# Patient Record
Sex: MALE | Race: WHITE | NOT HISPANIC OR LATINO | Employment: FULL TIME | URBAN - NONMETROPOLITAN AREA
[De-identification: names, ages, dates, MRNs, and addresses within clinical notes are randomized per-mention and may not be internally consistent; named-entity substitution may affect disease eponyms.]

---

## 2019-09-24 ENCOUNTER — OFFICE VISIT (OUTPATIENT)
Dept: INTERNAL MEDICINE | Facility: CLINIC | Age: 29
End: 2019-09-24

## 2019-09-24 VITALS
SYSTOLIC BLOOD PRESSURE: 145 MMHG | OXYGEN SATURATION: 100 % | HEART RATE: 84 BPM | BODY MASS INDEX: 26.82 KG/M2 | DIASTOLIC BLOOD PRESSURE: 94 MMHG | HEIGHT: 72 IN | TEMPERATURE: 97.9 F | WEIGHT: 198 LBS

## 2019-09-24 DIAGNOSIS — Z13.0 SCREENING FOR DISORDER OF BLOOD AND BLOOD-FORMING ORGANS: ICD-10-CM

## 2019-09-24 DIAGNOSIS — R22.32 MASS OF LEFT FOREARM: Primary | ICD-10-CM

## 2019-09-24 DIAGNOSIS — Z13.29 SCREENING FOR ENDOCRINE, METABOLIC AND IMMUNITY DISORDER: ICD-10-CM

## 2019-09-24 DIAGNOSIS — Z13.228 SCREENING FOR ENDOCRINE, METABOLIC AND IMMUNITY DISORDER: ICD-10-CM

## 2019-09-24 DIAGNOSIS — Z13.0 SCREENING FOR ENDOCRINE, METABOLIC AND IMMUNITY DISORDER: ICD-10-CM

## 2019-09-24 PROCEDURE — 99202 OFFICE O/P NEW SF 15 MIN: CPT | Performed by: NURSE PRACTITIONER

## 2019-09-24 NOTE — PROGRESS NOTES
Date: 2019    Name: Femi Galvez  : 1990    Chief Complaint:   Chief Complaint   Patient presents with   • Establish Care       HPI: Mr. Femi Galvez is a 29 y.o. male presents to establish care.  He has a knot on his inner left forearm, near elbow.  First noticed by his girlfriend almost 2 months ago.  He reports an injury, a strain or contusion, of left forearm about a year ago.  Told to stop lifting weights, used ice packs at home.  Lifts more carefully at work, now.  Reports pain of knot is 2/10 today.  2 days ago his entire lower left forearm was burning.  Used ice, felt like pain and burning was improved.  No tingling, numbness in left forearm or hand. Has noticed occasional decrease in  strength in left hand.      History:    Past Medical History:   Diagnosis Date   • Asthma    • Bipolar 1 disorder (CMS/[facility])    • Depression    • Heart disease    • Heart murmur        History reviewed. No pertinent surgical history.    Family History   Problem Relation Age of Onset   • Hypertension Mother    • Arthritis Father    • Hypertension Father    • Hyperlipidemia Father    • Hypertension Maternal Aunt    • Hypertension Maternal Uncle    • Cancer Maternal Uncle    • Hypertension Paternal Aunt    • Diabetes Paternal Uncle    • Hypertension Paternal Uncle    • Hypertension Maternal Grandmother    • Hypertension Maternal Grandfather    • Arthritis Paternal Grandmother    • Hypertension Paternal Grandmother    • Arthritis Paternal Grandfather    • Hypertension Paternal Grandfather        Social History     Socioeconomic History   • Marital status: Single     Spouse name: Not on file   • Number of children: Not on file   • Years of education: Not on file   • Highest education level: Not on file   Tobacco Use   • Smoking status: Current Some Day Smoker     Types: Electronic Cigarette   • Smokeless tobacco: Never Used   Substance and Sexual Activity   • Alcohol use: Yes     Comment: social   • Drug use:  "No       No Known Allergies    No current outpatient medications on file.    ROS:  Review of Systems   Constitutional: Negative for fatigue.   Respiratory: Negative for cough, shortness of breath and wheezing.    Cardiovascular: Negative for chest pain, palpitations and leg swelling.   Gastrointestinal: Negative for constipation, diarrhea and nausea.   Neurological: Negative for dizziness and headache.       VS:  Vitals:    09/24/19 1630   BP: 145/94   Pulse: 84   Temp: 97.9 °F (36.6 °C)   TempSrc: Temporal   SpO2: 100%   Weight: 89.8 kg (198 lb)   Height: 182.9 cm (72\")       PE:  Physical Exam   Constitutional: He is oriented to person, place, and time. He appears well-developed and well-nourished. No distress.   HENT:   Mouth/Throat: Oropharynx is clear and moist.   Eyes: Conjunctivae are normal.   Cardiovascular: Normal rate, regular rhythm and intact distal pulses.   Murmur heard.  Pulmonary/Chest: Effort normal and breath sounds normal.   Musculoskeletal:    equal bilaterally   Neurological: He is alert and oriented to person, place, and time.   Skin: Skin is warm. Capillary refill takes less than 2 seconds.   1mm soft, smooth. mobile nodule of inner left forearm near elbow; does not transilluminate, tender to palpation, no erythema       Assessment/Plan:  Fmei was seen today for establish care.    Diagnoses and all orders for this visit:    Mass of left forearm  -     US soft tissue  - Advised this may be an enlarged lymph node or lipoma; requests to have further testing to determine what it is  - Continue to use ice, as needed for pain, if effective  Screening for disorder of blood and blood-forming organs  -     CBC (No Diff)  Screening for endocrine, metabolic and immunity disorder  -     Comprehensive Metabolic Panel    Return if symptoms worsen or fail to improve.                  "

## 2019-10-01 ENCOUNTER — HOSPITAL ENCOUNTER (OUTPATIENT)
Dept: ULTRASOUND IMAGING | Facility: HOSPITAL | Age: 29
Discharge: HOME | End: 2019-10-01
Admitting: NURSE PRACTITIONER

## 2019-10-01 PROCEDURE — 76882 US LMTD JT/FCL EVL NVASC XTR: CPT

## 2019-10-21 ENCOUNTER — OFFICE VISIT (OUTPATIENT)
Dept: INTERNAL MEDICINE | Facility: CLINIC | Age: 29
End: 2019-10-21

## 2019-10-21 VITALS
BODY MASS INDEX: 26.95 KG/M2 | OXYGEN SATURATION: 100 % | TEMPERATURE: 98.1 F | HEIGHT: 72 IN | DIASTOLIC BLOOD PRESSURE: 81 MMHG | SYSTOLIC BLOOD PRESSURE: 148 MMHG | WEIGHT: 199 LBS | HEART RATE: 79 BPM

## 2019-10-21 DIAGNOSIS — D17.22 LIPOMA OF LEFT UPPER EXTREMITY: Primary | ICD-10-CM

## 2019-10-21 DIAGNOSIS — F31.9 BIPOLAR AFFECTIVE DISORDER, REMISSION STATUS UNSPECIFIED (HCC): ICD-10-CM

## 2019-10-21 DIAGNOSIS — Z23 NEED FOR HEPATITIS A VACCINATION: ICD-10-CM

## 2019-10-21 DIAGNOSIS — H93.239 PAINFUL SENSITIVITY TO SOUND: ICD-10-CM

## 2019-10-21 PROCEDURE — 99214 OFFICE O/P EST MOD 30 MIN: CPT | Performed by: NURSE PRACTITIONER

## 2019-10-21 PROCEDURE — 90632 HEPA VACCINE ADULT IM: CPT | Performed by: NURSE PRACTITIONER

## 2019-10-21 PROCEDURE — 90471 IMMUNIZATION ADMIN: CPT | Performed by: NURSE PRACTITIONER

## 2019-10-21 NOTE — PROGRESS NOTES
"Date: 10/21/2019    Name: Femi Galvez  : 1990    Chief Complaint:   Chief Complaint   Patient presents with   • Follow-up       HPI:  Femi Galvez is a 29 y.o. male presents for follow up of lipoma of left arm.  He reports it has not grown, does not hurt.  He would like to have it removed, as he plans to start lifting weights soon and feels it would aggravate lipoma, cause it to become problematic.    Femi reports he has a history of bipolar disorder and feels he may have Asperger's syndrome.  He feels he does not need medication or counseling for bipolar disorder at this time.  He does report an increase in depressive symptoms, \"occasionally\".  He reports a history of auditory hallucinations, not experiencing those at this time.  Currently denies SI, HI, change in appetite, difficulty sleeping or sleeping too much, racing thoughts excessive worry, impulsive behavior.  He believes he may have Asperger's syndrome based on feeling that he does not know how to connect with other people, cannot read nonverbal cues well.  His current significant other has a child with autism, and he recognizes behaviors that he has noticed in himself.  He is unable to relate to specific behaviors at this time, other than irritability and pain caused by certain sounds, patterns of sounds.    History:  The following portions of the patient's history were reviewed and updated as appropriate: allergies, current medications, past medical history, family history, surgical history, social history and problem list.      ROS:  Review of Systems   Constitutional: Negative for fatigue.   Respiratory: Negative for cough, shortness of breath and wheezing.    Cardiovascular: Negative for chest pain, palpitations and leg swelling.   Neurological: Negative for dizziness, headache and memory problem.   Psychiatric/Behavioral: Negative for agitation, decreased concentration, dysphoric mood and negative for hyperactivity. " "      VS:  Vitals:    10/21/19 1648   BP: 148/81   Pulse: 79   Temp: 98.1 °F (36.7 °C)   TempSrc: Temporal   SpO2: 100%   Weight: 90.3 kg (199 lb)   Height: 182.9 cm (72\")       PE:  Physical Exam   Constitutional: He is oriented to person, place, and time. He appears well-developed and well-nourished. No distress.   HENT:   Head: Normocephalic.   Mouth/Throat: Oropharynx is clear and moist.   Eyes: Conjunctivae and EOM are normal. Pupils are equal, round, and reactive to light.   Cardiovascular: Normal rate, regular rhythm, normal heart sounds and intact distal pulses.   Pulmonary/Chest: Effort normal and breath sounds normal.   Neurological: He is alert and oriented to person, place, and time.   Skin: Capillary refill takes less than 2 seconds.   Psychiatric: He has a normal mood and affect. His speech is normal and behavior is normal. Thought content normal. Cognition and memory are normal.     Assessment/Plan:  Femi was seen today for follow-up.    Diagnoses and all orders for this visit:    Lipoma of left upper extremity  -     Ambulatory Referral to General Surgery    Need for hepatitis A vaccination  -     Hepatitis A Vaccine Adult (HAVRIX) - Today  -     Hepatitis A Vaccine Adult  (HAVRIX) - Dose 2; Future    Bipolar affective disorder, remission status unspecified (CMS/HCC)  -     Ambulatory Referral to Psychiatry      Return if symptoms worsen or fail to improve.        "

## 2019-10-23 ENCOUNTER — TELEPHONE (OUTPATIENT)
Dept: INTERNAL MEDICINE | Facility: CLINIC | Age: 29
End: 2019-10-23

## 2019-10-23 NOTE — TELEPHONE ENCOUNTER
----- Message from RADHA Wallace sent at 10/23/2019  8:11 AM EDT -----  Please call patient and advise him I have referred him to psychiatry for further evaluation of possible asbergers syndrome.

## 2020-02-11 ENCOUNTER — OFFICE VISIT (OUTPATIENT)
Dept: INTERNAL MEDICINE | Facility: CLINIC | Age: 30
End: 2020-02-11

## 2020-02-11 VITALS
BODY MASS INDEX: 27.77 KG/M2 | SYSTOLIC BLOOD PRESSURE: 160 MMHG | DIASTOLIC BLOOD PRESSURE: 102 MMHG | HEIGHT: 72 IN | TEMPERATURE: 98 F | HEART RATE: 91 BPM | OXYGEN SATURATION: 100 % | WEIGHT: 205 LBS

## 2020-02-11 DIAGNOSIS — J06.9 UPPER RESPIRATORY TRACT INFECTION, UNSPECIFIED TYPE: Primary | ICD-10-CM

## 2020-02-11 DIAGNOSIS — L04.9 LYMPHADENITIS, ACUTE: ICD-10-CM

## 2020-02-11 PROCEDURE — 99214 OFFICE O/P EST MOD 30 MIN: CPT | Performed by: INTERNAL MEDICINE

## 2020-02-11 RX ORDER — AZITHROMYCIN 250 MG/1
TABLET, FILM COATED ORAL
Qty: 6 TABLET | Refills: 0 | Status: SHIPPED | OUTPATIENT
Start: 2020-02-11 | End: 2020-02-14

## 2020-02-11 NOTE — PATIENT INSTRUCTIONS
Infectious Mononucleosis  Infectious mononucleosis is a viral infection. It is often referred to as “mono.” It causes symptoms that affect various areas of the body, including the throat, upper air passages, and lymph glands. The liver or spleen may also be affected.  The virus spreads from person to person (is contagious) through close contact. The illness is usually not serious, and it typically goes away in 2-4 weeks without treatment. In rare cases, symptoms can be more severe and last longer, sometimes up to several months.  What are the causes?  This condition is commonly caused by the Lilly-Barr virus. This virus spreads through:  · Contact with an infected person’s saliva or other bodily fluids, often through:  ? Kissing.  ? Sexual contact.  ? Coughing.  ? Sneezing.  · Sharing utensils or drinking glasses that were recently used by an infected person.  · Blood transfusions.  · Organ transplantation.  What increases the risk?  You are more likely to develop this condition if:  · You are 15-24 years old.  What are the signs or symptoms?  Symptoms of this condition usually appear 4-6 weeks after infection. Symptoms may develop slowly and occur at different times. Common symptoms include:  · Sore throat.  · Headache.  · Extreme fatigue.  · Muscle aches.  · Swollen glands.  · Fever.  · Poor appetite.  · Rash.  Other symptoms include:  · Enlarged liver or spleen.  · Nausea.  · Abdominal pain.  How is this diagnosed?  This condition may be diagnosed based on:  · Your medical history.  · Your symptoms.  · A physical exam.  · Blood tests to confirm the diagnosis.  How is this treated?  There is no cure for this condition. Infectious mononucleosis usually goes away on its own with time. Treatment can help relieve symptoms and may include:  · Taking medicines to relieve pain and fever.  · Drinking plenty of fluids.  · Getting a lot of rest.  · Medicine (corticosteroids)to reduce swelling. This may be used if swelling  in the throat causes breathing or swallowing problems.  In some severe cases, treatment has to be given in a hospital.  Follow these instructions at home:  Medicines  · Take over-the-counter and prescription medicines only as told by your health care provider.  · Do not take ampicillin or amoxicillin. This may cause a rash.  · If you are under 18, do not take aspirin because of the association with Reye syndrome.  Activity  · Rest as needed.  · Do not participate in any of the following activities until your health care provider approves:  ? Contact sports. You may need to wait at least a month before participating in sports.  ? Exercise that requires a lot of energy.  ? Heavy lifting.  · Gradually resume your normal activities after your fever is gone, or when your health care provider tells you that you can. Be sure to rest when you get tired.  General instructions    · Avoid kissing or sharing utensils or drinking glasses until your health care provider tells you that you are no longer contagious.  · Drink enough fluid to keep your urine clear or pale yellow.  · Do not drink alcohol.  · If you have a sore throat:  ? Gargle with a salt-water mixture 3-4 times a day or as needed. To make a salt-water mixture, completely dissolve ½-1 tsp of salt in 1 cup of warm water.  ? Eat soft foods. Cold foods such as ice cream or frozen ice pops can soothe a sore throat.  ? Try sucking on hard candy.  · Wash your hands often with soap and water to avoid spreading the infection. If soap and water are not available, use hand .  How is this prevented?    · Avoid contact with people who are infected with mononucleosis. An infected person may not always appear ill, but he or she can still spread the virus.  · Avoid sharing utensils, drinking glasses, or toothbrushes.  · Wash your hands frequently with soap and water. If soap and water are not available, use hand .  · Use the inside of your elbow to cover your  "mouth when coughing or sneezing.  Contact a health care provider if:  · Your fever is not gone after 10 days.  · You have swollen lymph nodes that are not back to normal after 4 weeks.  · Your activity level is not back to normal after 2 months.  · Your skin or the white parts of your eyes turn yellow (jaundice).  · You have constipation. This may mean that you have:  ? Fewer bowel movements in a week than normal.  ? Difficulty having a bowel movement.  ? Stools that are dry, hard, or larger than normal.  Get help right away if:  · You have severe pain in your abdomen or shoulder.  · You are drooling.  · You have trouble swallowing.  · You have trouble breathing.  · You develop a stiff neck.  · You develop a severe headache.  · You cannot stop vomiting.  · You have jerky movements that you cannot control (seizures).  · You are confused.  · You have trouble with balance.  · Your nose or gums begin to bleed.  · You have signs of dehydration. These may include:  ? Weakness.  ? Sunken eyes.  ? Pale skin.  ? Dry mouth.  ? Rapid breathing or pulse.  Summary  · Infectious mononucleosis, or \"mono,\" is an infection caused by the Lilly-Barr virus.  · The virus that causes this condition is spread through bodily fluids. The virus is most commonly spread by kissing or sharing drinks or utensils with an infected person.  · You are more likely to develop this infection if you are 15-24 years old.  · Symptoms of this condition can include sore throat, headache, fever, swollen glands, muscle aches, extreme fatigue, and swollen liver or spleen.  · There is no cure for this condition. The goal of treatment is to help relieve symptoms. Treatment may include drinking plenty of water, getting a lot of rest, and taking pain relievers.  This information is not intended to replace advice given to you by your health care provider. Make sure you discuss any questions you have with your health care provider.  Document Released: 12/15/2001 " Document Revised: 09/05/2017 Document Reviewed: 09/05/2017  [employer] Interactive Patient Education © 2019 [employer]

## 2020-02-13 ENCOUNTER — TELEPHONE (OUTPATIENT)
Dept: INTERNAL MEDICINE | Facility: CLINIC | Age: 30
End: 2020-02-13

## 2020-02-13 NOTE — TELEPHONE ENCOUNTER
Doctors note created and faxed to # provided. Spoke with patient to let him know that appointment was cancelled because he was given incorrect information for his original appointment (scheduled 02/13 but was told 02/11) and we worked him in on 02/11/20 therefore his other appointment was not needed. Patient voiced understanding.

## 2020-02-13 NOTE — TELEPHONE ENCOUNTER
Patient called requesting a doctors note for 02/12/20-02/13/20 for his job.     Patient would like this faxed to: [phone].    Patient call back: [phone]

## 2020-02-14 ENCOUNTER — OFFICE VISIT (OUTPATIENT)
Dept: INTERNAL MEDICINE | Facility: CLINIC | Age: 30
End: 2020-02-14

## 2020-02-14 VITALS
DIASTOLIC BLOOD PRESSURE: 98 MMHG | WEIGHT: 205.8 LBS | TEMPERATURE: 97.8 F | HEIGHT: 72 IN | BODY MASS INDEX: 27.87 KG/M2 | HEART RATE: 102 BPM | OXYGEN SATURATION: 98 % | SYSTOLIC BLOOD PRESSURE: 148 MMHG

## 2020-02-14 DIAGNOSIS — J06.9 ACUTE URI: Primary | ICD-10-CM

## 2020-02-14 DIAGNOSIS — R59.1 LYMPHADENOPATHY OF HEAD AND NECK: ICD-10-CM

## 2020-02-14 PROCEDURE — 99213 OFFICE O/P EST LOW 20 MIN: CPT | Performed by: FAMILY MEDICINE

## 2020-02-14 RX ORDER — BENZONATATE 200 MG/1
200 CAPSULE ORAL 3 TIMES DAILY PRN
Qty: 30 CAPSULE | Refills: 0 | OUTPATIENT
Start: 2020-02-14 | End: 2020-04-08

## 2020-02-14 RX ORDER — AMOXICILLIN AND CLAVULANATE POTASSIUM 875; 125 MG/1; MG/1
1 TABLET, FILM COATED ORAL EVERY 12 HOURS SCHEDULED
Qty: 20 TABLET | Refills: 0 | OUTPATIENT
Start: 2020-02-14 | End: 2020-04-08

## 2020-02-14 RX ORDER — AMOXICILLIN AND CLAVULANATE POTASSIUM 875; 125 MG/1; MG/1
1 TABLET, FILM COATED ORAL EVERY 12 HOURS SCHEDULED
Qty: 20 TABLET | Refills: 0 | Status: SHIPPED | OUTPATIENT
Start: 2020-02-14 | End: 2020-02-14 | Stop reason: SDUPTHER

## 2020-02-14 RX ORDER — METHYLPREDNISOLONE 4 MG/1
TABLET ORAL
Qty: 21 EACH | Refills: 0 | OUTPATIENT
Start: 2020-02-14 | End: 2020-04-08

## 2020-02-14 RX ORDER — METHYLPREDNISOLONE 4 MG/1
TABLET ORAL
Qty: 21 EACH | Refills: 0 | Status: SHIPPED | OUTPATIENT
Start: 2020-02-14 | End: 2020-02-14 | Stop reason: SDUPTHER

## 2020-02-14 RX ORDER — BENZONATATE 200 MG/1
200 CAPSULE ORAL 3 TIMES DAILY PRN
Qty: 30 CAPSULE | Refills: 0 | Status: SHIPPED | OUTPATIENT
Start: 2020-02-14 | End: 2020-02-14 | Stop reason: SDUPTHER

## 2020-02-14 NOTE — PROGRESS NOTES
Chief Complaint   Patient presents with   • Cough     progressively getting worse for last 5 months, lymph nodes under chin are swollen since last week, now painful with talking and swallowing       Subjective     History of Present Illness   Femi Galvez is a 29 y.o. male presenting for follow up with concerns about a large and tender lymph lymph node under the right mandible over the last week.  The lesion is becoming more tender and painful with talking and swallowing.  He denies any other fevers or chills.  Denies significant upper respiratory symptoms other than mild sore throat.  Patient shares that he has been having a cough which is persistent over the last 5 months.    The following portions of the patient's history were reviewed and updated as appropriate: allergies, current medications, past family history, past medical history, past social history, past surgical history and problem list.    Review of Systems   Constitutional: Negative for chills, fatigue and fever.   HENT: Positive for sore throat. Negative for congestion, ear pain, rhinorrhea and sinus pressure.    Eyes: Negative for visual disturbance.   Respiratory: Negative for cough, chest tightness, shortness of breath and wheezing.    Cardiovascular: Negative for chest pain, palpitations and leg swelling.   Gastrointestinal: Negative for abdominal pain, blood in stool, constipation, diarrhea, nausea and vomiting.   Endocrine: Negative for polydipsia and polyuria.   Genitourinary: Negative for dysuria and hematuria.   Musculoskeletal: Negative for arthralgias and back pain.   Skin: Negative for rash.   Neurological: Negative for dizziness, light-headedness, numbness and headaches.   Psychiatric/Behavioral: Negative for dysphoric mood and sleep disturbance. The patient is not nervous/anxious.        Allergies   Allergen Reactions   • Marijuana (Cannabis Sativa) Swelling       Past Medical History:   Diagnosis Date   • Asthma    • Bipolar 1  "disorder (CMS/[facility])    • Depression    • Heart disease    • Heart murmur        Social History     Socioeconomic History   • Marital status: Single     Spouse name: Not on file   • Number of children: Not on file   • Years of education: Not on file   • Highest education level: Not on file   Tobacco Use   • Smoking status: Current Some Day Smoker     Types: Electronic Cigarette   • Smokeless tobacco: Never Used   Substance and Sexual Activity   • Alcohol use: Yes     Comment: social   • Drug use: No        History reviewed. No pertinent surgical history.    Family History   Problem Relation Age of Onset   • Hypertension Mother    • Arthritis Father    • Hypertension Father    • Hyperlipidemia Father    • Hypertension Maternal Aunt    • Hypertension Maternal Uncle    • Cancer Maternal Uncle    • Hypertension Paternal Aunt    • Diabetes Paternal Uncle    • Hypertension Paternal Uncle    • Hypertension Maternal Grandmother    • Hypertension Maternal Grandfather    • Arthritis Paternal Grandmother    • Hypertension Paternal Grandmother    • Arthritis Paternal Grandfather    • Hypertension Paternal Grandfather          Current Outpatient Medications:   •  azithromycin (ZITHROMAX Z-CLOVIS) 250 MG tablet, Take 2 tablets the first day, then 1 tablet daily for 4 days., Disp: 6 tablet, Rfl: 0    Objective   BP (!) 160/102   Pulse 91   Temp 98 °F (36.7 °C)   Ht 182.9 cm (72\")   Wt 93 kg (205 lb)   SpO2 100%   BMI 27.80 kg/m²     Physical Exam   Constitutional: He is oriented to person, place, and time. He appears well-developed and well-nourished.   HENT:   Head: Normocephalic and atraumatic.   Mild redness in throat.  Large 2 cm lymph node with surrounding redness under right mandible   Eyes: Conjunctivae are normal.   Neck: Normal range of motion. Neck supple.   Pulmonary/Chest: Effort normal.   Musculoskeletal: Normal range of motion.   Neurological: He is alert and oriented to person, place, and time.   Skin: No rash " noted.   Psychiatric: He has a normal mood and affect. His behavior is normal.   Nursing note and vitals reviewed.      Assessment/Plan   Femi was seen today for cough.    Diagnoses and all orders for this visit:    Upper respiratory tract infection, unspecified type  -     azithromycin (ZITHROMAX Z-CLOVIS) 250 MG tablet; Take 2 tablets the first day, then 1 tablet daily for 4 days.    Lymphadenitis, acute  -     azithromycin (ZITHROMAX Z-CLOVIS) 250 MG tablet; Take 2 tablets the first day, then 1 tablet daily for 4 days.          Discussion Summary:  Patient is a 29 y.o. male presenting for follow up with lymphadenitis and sore throat.  Symptoms are concerning for bacterial infection given persistence and significant size of the lymph node.  I prefer to treat with antibiotics and monitor symptoms.  Should lymphadenitis persists, consider ultrasound or biopsy in 2 to 3 weeks.      Follow up:  Return if symptoms worsen or fail to improve.

## 2020-02-14 NOTE — PROGRESS NOTES
"Femi Galvez is a 29 y.o. male.    Chief Complaint   Patient presents with   • Cough   • Edema       HPI   Patient reports they have not been feeling well for 5  months. He admits to nasal congestion, sore throat, cough, drainage, right sided cervical lymphadenopathy, ear pain with yawning.  He denies fever.  They have tried zithromax for this issue without good response.  He was sent home from work today due to symptoms.     The following portions of the patient's history were reviewed and updated as appropriate: allergies, current medications, past family history, past medical history, past social history, past surgical history and problem list.     Allergies   Allergen Reactions   • Marijuana (Cannabis Sativa) Swelling         Current Outpatient Medications:   •  amoxicillin-clavulanate (AUGMENTIN) 875-125 MG per tablet, Take 1 tablet by mouth Every 12 (Twelve) Hours., Disp: 20 tablet, Rfl: 0  •  benzonatate (TESSALON) 200 MG capsule, Take 1 capsule by mouth 3 (Three) Times a Day As Needed for Cough., Disp: 30 capsule, Rfl: 0  •  methylPREDNISolone (MEDROL, CLOVIS,) 4 MG tablet, Take as directed on package instructions., Disp: 21 each, Rfl: 0    ROS    Review of Systems   Constitutional: Positive for fatigue. Negative for chills and fever.   HENT: Positive for congestion, ear pain, postnasal drip, rhinorrhea, sore throat and swollen glands.    Respiratory: Positive for cough. Negative for shortness of breath.    Cardiovascular: Negative for chest pain.   Gastrointestinal: Negative for abdominal pain, nausea and vomiting.   Musculoskeletal: Positive for neck pain.   Hematological: Positive for adenopathy.       Vitals:    02/14/20 1633   BP: 148/98   BP Location: Left arm   Patient Position: Sitting   Cuff Size: Adult   Pulse: 102   Temp: 97.8 °F (36.6 °C)   TempSrc: Temporal   SpO2: 98%   Weight: 93.4 kg (205 lb 12.8 oz)   Height: 182.9 cm (72\")     Body mass index is 27.91 kg/m².    Physical Exam     Physical " Exam   Constitutional: He is oriented to person, place, and time. He appears well-developed and well-nourished. No distress.   HENT:   Head: Normocephalic and atraumatic.   Right Ear: Tympanic membrane and external ear normal.   Left Ear: Tympanic membrane and external ear normal.   Mouth/Throat: Posterior oropharyngeal erythema (PND) present.   Eyes: Conjunctivae and EOM are normal.   Neck: Neck supple.   Cardiovascular: Normal rate and regular rhythm.   No murmur heard.  Pulmonary/Chest: Effort normal and breath sounds normal. No respiratory distress. He has no wheezes.   Musculoskeletal: Normal range of motion. He exhibits no edema.   Right SCM tenderness and tension   Lymphadenopathy:     He has cervical adenopathy.        Right cervical: Superficial cervical (significantly enlarged) adenopathy present.   Neurological: He is alert and oriented to person, place, and time. No cranial nerve deficit.   Skin: Skin is warm and dry.   Psychiatric: He has a normal mood and affect. His behavior is normal.       Assessment/Plan    Problem List Items Addressed This Visit        Respiratory    Acute URI - Primary    Relevant Medications    amoxicillin-clavulanate (AUGMENTIN) 875-125 MG per tablet       Immune and Lymphatic    Lymphadenopathy of head and neck        Patient is being treated with augmentin and steroids.  Advised may take tessalon perles for cough.  Discussed with patient if there has been no resolution with antibiotics and steroids in the next couple of weeks, will proceed with right neck ultrasound.  Discussed with patient he should be fine to go back to work on Monday and [facility] paperwork filled out today for days missed.     New Medications Ordered This Visit   Medications   • benzonatate (TESSALON) 200 MG capsule     Sig: Take 1 capsule by mouth 3 (Three) Times a Day As Needed for Cough.     Dispense:  30 capsule     Refill:  0   • amoxicillin-clavulanate (AUGMENTIN) 875-125 MG per tablet     Sig: Take 1  tablet by mouth Every 12 (Twelve) Hours.     Dispense:  20 tablet     Refill:  0   • methylPREDNISolone (MEDROL, CLOVIS,) 4 MG tablet     Sig: Take as directed on package instructions.     Dispense:  21 each     Refill:  0       No orders of the defined types were placed in this encounter.      Return in about 2 weeks (around 2/28/2020) for lymphadenopathy.    Zelda Garibay, DO

## 2020-02-16 PROBLEM — J06.9 ACUTE URI: Status: ACTIVE | Noted: 2020-02-16

## 2020-02-16 PROBLEM — R59.1 LYMPHADENOPATHY OF HEAD AND NECK: Status: ACTIVE | Noted: 2020-02-16

## 2020-03-31 ENCOUNTER — NURSE TRIAGE (OUTPATIENT)
Dept: CALL CENTER | Facility: HOSPITAL | Age: 30
End: 2020-03-31

## 2020-03-31 NOTE — TELEPHONE ENCOUNTER
He was at work today, and was sent home because someone in the work tested positive for Covid -19 - He has a cough that has gotten worse and a tickle to his throat, Offered a letter. He said he had a letter from work. He will self isolate for 14 days.  Advised My chart, and how to get into it, recommended a tele health visit with his MD today. Advised to check for a fever twice a day.     Reason for Disposition  • 1] COVID-19 infection diagnosed or suspected AND [2] mild symptoms (fever, cough) AND [2] no trouble breathing or other complications    Additional Information  • Negative: SEVERE difficulty breathing (e.g., struggling for each breath, speaks in single words)  • Negative: Difficult to awaken or acting confused (e.g., disoriented, slurred speech)  • Negative: Bluish (or gray) lips or face now  • Negative: Shock suspected (e.g., cold/pale/clammy skin, too weak to stand, low BP, rapid pulse)  • Negative: Sounds like a life-threatening emergency to the triager  • Negative: [1] COVID-19 suspected (e.g., cough, fever, shortness of breath) AND [2] public health department recommends testing  • Negative: [1] COVID-19 exposure AND [2] no symptoms  • Negative: COVID-19 and Breastfeeding, questions about  • Negative: SEVERE or constant chest pain (Exception: mild central chest pain, present only when coughing)  • Negative: MODERATE difficulty breathing (e.g., speaks in phrases, SOB even at rest, pulse 100-120)  • Negative: Patient sounds very sick or weak to the triager  • Negative: MILD difficulty breathing (e.g., minimal/no SOB at rest, SOB with walking, pulse <100)  • Negative: Chest pain  • Negative: Fever > 103 F (39.4 C)  • Negative: [1] Fever > 101 F (38.3 C) AND [2] age > 60  • Negative: [1] Fever > 100.0 F (37.8 C) AND [2] bedridden (e.g., nursing home patient, CVA, chronic illness, recovering from surgery)  • Negative: HIGH RISK patient (e.g., age > 64 years, diabetes, heart or lung disease, weak immune  "system)  • Negative: Fever present > 3 days (72 hours)  • Negative: [1] Fever returns after gone for over 24 hours AND [2] symptoms worse or not improved  • Negative: [1] Continuous (nonstop) coughing interferes with work or school AND [2] no improvement using cough treatment per protocol  • Negative: Cough present > 3 weeks    Answer Assessment - Initial Assessment Questions  1. COVID-19 DIAGNOSIS: \"Who made your Coronavirus (COVID-19) diagnosis?\" \"Was it confirmed by a positive lab test?\" If not diagnosed by a HCP, ask \"Are there lots of cases (community spread) where you live?\" (See public health department website, if unsure)    * MAJOR community spread: high number of cases; numbers of cases are increasing; many people hospitalized.    * MINOR community spread: low number of cases; not increasing; few or no people hospitalized      *No Answer*A co worker was dx: and tested positive   2. ONSET: \"When did the COVID-19 symptoms start?\"       He has the cough for 9 month, it has gotten better then it has gotten worse.   3. WORST SYMPTOM: \"What is your worst symptom?\" (e.g., cough, fever, shortness of breath, muscle aches)      Cough   4. COUGH: \"How bad is the cough?\"        It does not wake him up from sleep   5. FEVER: \"Do you have a fever?\" If so, ask: \"What is your temperature, how was it measured, and when did it start?\"      n  6. RESPIRATORY STATUS: \"Describe your breathing?\" (e.g., shortness of breath, wheezing, unable to speak)       n  7. BETTER-SAME-WORSE: \"Are you getting better, staying the same or getting worse compared to yesterday?\"  If getting worse, ask, \"In what way?\"      He has a tingle in there throat/ sore throat   8. HIGH RISK DISEASE: \"Do you have any chronic medical problems?\" (e.g., asthma, heart or lung disease, weak immune system, etc.)      Maybe heart disease. He has not followed. He has asthma   9. PREGNANCY: \"Is there any chance you are pregnant?\" \"When was your last menstrual " "period?\"      n   10. OTHER SYMPTOMS: \"Do you have any other symptoms?\"  (e.g., runny nose, headache, sore throat, loss of smell)        Sore throat- tingle    Protocols used: CORONAVIRUS (COVID-19) DIAGNOSED OR SUSPECTED-ADULT-AH      "

## 2020-04-01 ENCOUNTER — TELEPHONE (OUTPATIENT)
Dept: INTERNAL MEDICINE | Facility: CLINIC | Age: 30
End: 2020-04-01

## 2020-04-01 NOTE — TELEPHONE ENCOUNTER
Spoke with Chasidy regarding pt being exposed to Covid-19 we recommended him to self quarantine for 14 days and if he needed us to let us know.

## 2020-04-08 ENCOUNTER — NURSE TRIAGE (OUTPATIENT)
Dept: CALL CENTER | Facility: HOSPITAL | Age: 30
End: 2020-04-08

## 2020-04-08 NOTE — TELEPHONE ENCOUNTER
"    Reason for Disposition  • MILD difficulty breathing (e.g., minimal/no SOB at rest, SOB with walking, pulse <100)    Additional Information  • Negative: SEVERE difficulty breathing (e.g., struggling for each breath, speaks in single words)  • Negative: Difficult to awaken or acting confused (e.g., disoriented, slurred speech)  • Negative: Bluish (or gray) lips or face now  • Negative: Shock suspected (e.g., cold/pale/clammy skin, too weak to stand, low BP, rapid pulse)  • Negative: Sounds like a life-threatening emergency to the triager  • Negative: [1] COVID-19 suspected (e.g., cough, fever, shortness of breath) AND [2] public health department recommends testing  • Negative: [1] COVID-19 exposure AND [2] no symptoms  • Negative: COVID-19 and Breastfeeding, questions about  • Negative: SEVERE or constant chest pain (Exception: mild central chest pain, present only when coughing)  • Negative: MODERATE difficulty breathing (e.g., speaks in phrases, SOB even at rest, pulse 100-120)  • Negative: Patient sounds very sick or weak to the triager    Answer Assessment - Initial Assessment Questions  1. COVID-19 DIAGNOSIS: \"Who made your Coronavirus (COVID-19) diagnosis?\" \"Was it confirmed by a positive lab test?\" If not diagnosed by a HCP, ask \"Are there lots of cases (community spread) where you live?\" (See public health department website, if unsure)    positive co workers, now 12 on day 12 of self isolation  2. ONSET: \"When did the COVID-19 symptoms start?\"      Last 3 days  3. WORST SYMPTOM: \"What is your worst symptom?\" (e.g., cough, fever, shortness of breath, muscle aches)      Cough, chills, fever, sob  4. COUGH: \"How bad is the cough?\"        Dry hacking  5. FEVER: \"Do you have a fever?\" If so, ask: \"What is your temperature, how was it measured, and when did it start?\"      Not measuring feels 'feverish'  6. RESPIRATORY STATUS: \"Describe your breathing?\" (e.g., shortness of breath, wheezing, unable to speak)    " " Sob with talking  7. BETTER-SAME-WORSE: \"Are you getting better, staying the same or getting worse compared to yesterday?\"  If getting worse, ask, \"In what way?\"     Getting worse  8. HIGH RISK DISEASE: \"Do you have any chronic medical problems?\" (e.g., asthma, heart or lung disease, weak immune system, etc.)    Asthma, smoking  9. PREGNANCY: \"Is there any chance you are pregnant?\" \"When was your last menstrual period?\"      no  10. OTHER SYMPTOMS: \"Do you have any other symptoms?\"  (e.g., runny nose, headache, sore throat, loss of smell)        asthma    Protocols used: CORONAVIRUS (COVID-19) DIAGNOSED OR SUSPECTED-ADULT-AH      "

## 2020-04-09 ENCOUNTER — APPOINTMENT (OUTPATIENT)
Dept: GENERAL RADIOLOGY | Facility: HOSPITAL | Age: 30
End: 2020-04-09

## 2020-04-09 ENCOUNTER — HOSPITAL ENCOUNTER (EMERGENCY)
Facility: HOSPITAL | Age: 30
Discharge: HOME | End: 2020-04-09
Attending: EMERGENCY MEDICINE | Admitting: EMERGENCY MEDICINE

## 2020-04-09 ENCOUNTER — TELEMEDICINE (OUTPATIENT)
Dept: FAMILY MEDICINE CLINIC | Facility: TELEHEALTH | Age: 30
End: 2020-04-09

## 2020-04-09 VITALS
DIASTOLIC BLOOD PRESSURE: 103 MMHG | TEMPERATURE: 98.4 F | OXYGEN SATURATION: 96 % | RESPIRATION RATE: 20 BRPM | WEIGHT: 200 LBS | HEIGHT: 72 IN | BODY MASS INDEX: 27.09 KG/M2 | HEART RATE: 91 BPM | SYSTOLIC BLOOD PRESSURE: 162 MMHG

## 2020-04-09 DIAGNOSIS — R05.9 COUGH: ICD-10-CM

## 2020-04-09 DIAGNOSIS — Z20.822 EXPOSURE TO COVID-19 VIRUS: ICD-10-CM

## 2020-04-09 DIAGNOSIS — R68.89 LOW BODY TEMPERATURE: Primary | ICD-10-CM

## 2020-04-09 DIAGNOSIS — R06.02 SHORTNESS OF BREATH: ICD-10-CM

## 2020-04-09 DIAGNOSIS — R05.9 COUGH: Primary | ICD-10-CM

## 2020-04-09 PROCEDURE — 99283 EMERGENCY DEPT VISIT LOW MDM: CPT

## 2020-04-09 PROCEDURE — 71045 X-RAY EXAM CHEST 1 VIEW: CPT

## 2020-04-09 PROCEDURE — 99214 OFFICE O/P EST MOD 30 MIN: CPT | Performed by: NURSE PRACTITIONER

## 2020-04-09 RX ORDER — DEXTROMETHORPHAN HYDROBROMIDE AND PROMETHAZINE HYDROCHLORIDE 15; 6.25 MG/5ML; MG/5ML
5 SYRUP ORAL 4 TIMES DAILY PRN
Qty: 70 ML | Refills: 0 | Status: SHIPPED | OUTPATIENT
Start: 2020-04-09 | End: 2020-05-06

## 2020-04-09 RX ORDER — CLONIDINE HYDROCHLORIDE 0.1 MG/1
0.1 TABLET ORAL ONCE
Status: COMPLETED | OUTPATIENT
Start: 2020-04-09 | End: 2020-04-09

## 2020-04-09 RX ADMIN — CLONIDINE HYDROCHLORIDE 0.1 MG: 0.1 TABLET ORAL at 04:02

## 2020-04-09 NOTE — PROGRESS NOTES
CHIEF COMPLAINT  Chief Complaint   Patient presents with   • Sleeping Problem   • Cough   • Chills           HPI  Femi Galvez is a 29 y.o. male  presents with complaint of low temperature reported taking his temp and it was 92 degrees then he shivered and covered up until his temp came up to 98. Reported waking from sleep gasping for air. Feels like he cannot get a deep breath. He complains of dry cough and mild diarrhea. Stated he is on day 12 of his 14 day quarantine, and started feeling worse yesterday. He was seen at the urgent care 4/8/2020. He was tested for Covid 19 and was instructed to stay in quarantine and was given Albuterol,budesonide, for asthma exacerbation, and refill of Lisinopril for HTN.    Review of Systems   Constitutional: Positive for activity change, chills, diaphoresis and fatigue.   HENT: Positive for congestion. Negative for sore throat.    Respiratory: Positive for cough, choking, chest tightness and shortness of breath.    Cardiovascular: Negative for chest pain.   Gastrointestinal: Negative for diarrhea.   Psychiatric/Behavioral: Positive for sleep disturbance. The patient is nervous/anxious.        Past Medical History:   Diagnosis Date   • Asthma    • Bipolar 1 disorder (CMS/HCC)    • Depression    • Heart disease    • Heart murmur        Family History   Problem Relation Age of Onset   • Hypertension Mother    • Arthritis Father    • Hypertension Father    • Hyperlipidemia Father    • Hypertension Maternal Aunt    • Hypertension Maternal Uncle    • Liver cancer Maternal Uncle    • Hypertension Paternal Aunt    • Diabetes Paternal Uncle    • Hypertension Paternal Uncle    • Hypertension Maternal Grandmother    • Hypertension Maternal Grandfather    • Arthritis Paternal Grandmother    • Hypertension Paternal Grandmother    • Arthritis Paternal Grandfather    • Hypertension Paternal Grandfather        Social History     Socioeconomic History   • Marital status: Single     Spouse  name: Not on file   • Number of children: Not on file   • Years of education: Not on file   • Highest education level: Not on file   Tobacco Use   • Smoking status: Current Some Day Smoker     Types: Electronic Cigarette   • Smokeless tobacco: Never Used   Substance and Sexual Activity   • Alcohol use: Yes     Comment: social   • Drug use: No         There were no vitals taken for this visit.      Physical Exam Not performed      Note: 35 minute discussion regarding low temperature, and proper functioning thermometer. He reported that approx 10 pm he was chilling and took his temperature and it read 92 degrees. I recommended that he change the battery or try another source for taking his temperature to check its accuracy. He lives alone and has not left his home since quarantined. Stated he is use to working 70-80 hours a week, and staying home is getting to him. He suffers from Depression. Stated he was a former smoker then switched to vaping and has not vaped since his quarantine. Grandparents live close other relatives live far away. We discussed when to go to hospital such as worsening of breathing. We discussed that he sleep disturbance may be related to inactivity and altered sleep pattern. Stated he has plenty to eat and drink, but he has been drinking mostly water. We dicussed daily care and trying to maintain a routine to promote better sleep. He was focused on his abnormally low temperature. I recommended that he go to the hospital if he continued to experience low temperature worsening of his symptoms.       No results found for this or any previous visit.    Femi was seen today for sleeping problem, cough and chills.    Diagnoses and all orders for this visit:    Low body temperature  -     QUESTIONNAIRE SERIES    Shortness of breath  -     QUESTIONNAIRE SERIES    Exposure to Covid-19 Virus  -     QUESTIONNAIRE SERIES    Cough  -     QUESTIONNAIRE SERIES          FOLLOW-UP   E Gus APRN if no  improvement or you should go to an Urgent Care or Emergency Department if worsening of symptoms    Patient verbalizes understanding of medication dosage, comfort measures, instructions for treatment and follow-up.    RADHA Stoner  04/09/2020  1:49 AM    This visit was performed via Telehealth.  This patient has been instructed to follow-up with their primary care provider if their symptoms worsen or the treatment provided does not resolve their illness.

## 2020-04-09 NOTE — ED PROVIDER NOTES
TRIAGE CHIEF COMPLAINT:     Nursing and triage notes reviewed    Chief Complaint   Patient presents with   • POSSIBLE EXPOSURE      HPI: Femi Galvez is a 29 y.o. male who presents to the emergency department complaining of fever and cough.  Patient with exposure to the coronavirus.  Patient was seen at urgent care yesterday and was tested for the coronavirus, results are still pending.  Patient states he has felt very anxious over the past several days.  He has had a dry cough.  He has had intermittent fevers and shaking chills at home.  He has not had nausea or vomiting.  He states yesterday his temperature was low.  He was given some inhalers and told to self quarantine for 14 days.    REVIEW OF SYSTEMS: All other systems reviewed and are negative     PAST MEDICAL HISTORY:   Past Medical History:   Diagnosis Date   • Asthma    • Bipolar 1 disorder (CMS/HCC)    • Depression    • Heart disease    • Heart murmur         FAMILY HISTORY:   Family History   Problem Relation Age of Onset   • Hypertension Mother    • Arthritis Father    • Hypertension Father    • Hyperlipidemia Father    • Hypertension Maternal Aunt    • Hypertension Maternal Uncle    • Liver cancer Maternal Uncle    • Hypertension Paternal Aunt    • Diabetes Paternal Uncle    • Hypertension Paternal Uncle    • Hypertension Maternal Grandmother    • Hypertension Maternal Grandfather    • Arthritis Paternal Grandmother    • Hypertension Paternal Grandmother    • Arthritis Paternal Grandfather    • Hypertension Paternal Grandfather         SOCIAL HISTORY:   Social History     Socioeconomic History   • Marital status: Single     Spouse name: Not on file   • Number of children: Not on file   • Years of education: Not on file   • Highest education level: Not on file   Tobacco Use   • Smoking status: Current Some Day Smoker     Types: Electronic Cigarette   • Smokeless tobacco: Never Used   Substance and Sexual Activity   • Alcohol use: Yes     Comment:  social   • Drug use: No        SURGICAL HISTORY:   History reviewed. No pertinent surgical history.     CURRENT MEDICATIONS:      Medication List      ASK your doctor about these medications    albuterol sulfate  (90 Base) MCG/ACT inhaler  Commonly known as:  PROVENTIL HFA;VENTOLIN HFA;PROAIR HFA  Inhale 2 puffs Every 4 (Four) Hours As Needed for Wheezing.     budesonide-formoterol 160-4.5 MCG/ACT inhaler  Commonly known as:  SYMBICORT  Inhale 2 puffs 2 (Two) Times a Day.     lisinopril 20 MG tablet  Commonly known as:  PRINIVIL,ZESTRIL  Take 1 tablet by mouth Daily.           ALLERGIES: Marijuana (cannabis sativa)     PHYSICAL EXAM:   VITAL SIGNS:   Vitals:    04/09/20 0330   BP: (!) 177/134   Pulse:    Resp:    Temp:    SpO2: 98%      CONSTITUTIONAL: Awake, oriented, appears non-toxic   HENT: Atraumatic, normocephalic, oral mucosa pink and moist, airway patent. Nares patent without drainage. External ears normal.   EYES: Conjunctiva clear  NECK: Trachea midline   CARDIOVASCULAR: Normal heart rate, Normal rhythm, No murmurs, rubs, gallops   PULMONARY/CHEST: Clear to auscultation, no rhonchi, wheezes, or rales. Symmetrical breath sounds.  ABDOMINAL: Non-distended, soft, non-tender - no rebound or guarding.  NEUROLOGIC: Non-focal, moving all four extremities, no gross sensory or motor deficits.   EXTREMITIES: No clubbing, cyanosis, or edema   SKIN: Warm, Dry, No erythema, No rash     ED COURSE / MEDICAL DECISION MAKING:   Femi Galvez is a 29 y.o. male who presents to the emergency department for evaluation of cough and fever.  Patient is afebrile with a normal temperature on arrival in the emergency department.  Patient is noted to be hypertensive but oxygen saturation is stable on room air.  Patient is breathing comfortably with no increased work of breathing.  Patient was started on hypertension medication yesterday when his blood pressure was checked at urgent care.  He states his blood pressure has run  high for several years but this is the first time he is required any medication.  Patient appears well and is not in any respiratory distress.  Will obtain a chest x-ray but do not feel any laboratory testing is currently indicated as patient already has a pending coronavirus test.    Chest x-ray per my interpretation did not reveal any obvious acute cardiopulmonary process.    Patient was given some clonidine with mild improvement in his blood pressure.  Will have him continue the new blood pressure medication at home and take his blood pressure several times.  Given patient is not in any respiratory distress I reiterated to him that currently he does not require admission to the hospital and therefore further laboratory testing would likely be on beneficial.  I did stress the importance that if he felt like he continued to worsen that he needed to return to the emergency department immediately.  Otherwise patient needs to quarantine himself at home to try and stop the spread of the disease.  Patient expressed understanding was discharged in good condition with return precautions.    DECISION TO DISCHARGE/ADMIT: see ED care timeline     FINAL IMPRESSION:   1 --cough  2 --   3 --     Electronically signed by: Marisel Peres MD, 4/9/2020 04:00       Marisel Peres MD  04/09/20 0436

## 2020-04-09 NOTE — DISCHARGE INSTRUCTIONS
Your x-ray does not show any sign of pneumonia at this time.  Please continue to take your new blood pressure medication as prescribed.  If you are able you should check your blood pressure several times daily and record the values, this will help us determine how effective the new medication is in reducing your blood pressure.  Currently it is important that you keep your self quarantined at home as it is very possible that you are currently infected by the coronavirus.  This will help stop the spread of disease.  If you do feel like you are worsening you should return to the emergency department immediately.

## 2020-04-09 NOTE — PATIENT INSTRUCTIONS
Diagnosis Plan   1. Low body temperature     2. Shortness of breath     3. Cough     4. Exposure to Covid-19 Virus        Take home medication as prescribed. Follow up with PCP in 1-2 days if symptoms don't improve, If your symptoms worsen you should be seen sooner.        How to Use a Metered Dose Inhaler  A metered dose inhaler is a handheld device for taking medicine that must be breathed into the lungs (inhaled). The device can be used to deliver a variety of inhaled medicines, including:  · Quick relief or rescue medicines, such as bronchodilators.  · Controller medicines, such as corticosteroids.  The medicine is delivered by pushing down on a metal canister to release a preset amount of spray and medicine. Each device contains the amount of medicine that is needed for a preset number of uses (inhalations).  Your health care provider may recommend that you use a spacer with your inhaler to help you take the medicine more effectively. A spacer is a plastic tube with a mouthpiece on one end and an opening that connects to the inhaler on the other end. A spacer holds the medicine in a tube for a short time, which allows you to inhale more medicine.  What are the risks?  If you do not use your inhaler correctly, medicine might not reach your lungs to help you breathe.  Inhaler medicine can cause side effects, such as:  · Mouth or throat infection.  · Cough.  · Hoarseness.  · Headache.  · Nausea and vomiting.  · Lung infection (pneumonia) in people who have a lung condition called COPD.  How to use a metered dose inhaler without a spacer    1. Remove the cap from the inhaler.  2. If you are using the inhaler for the first time, shake it for 5 seconds, turn it away from your face, then release 4 puffs into the air. This is called priming.  3. Shake the inhaler for 5 seconds.  4. Position the inhaler so the top of the canister faces up.  5. Put your index finger on the top of the medicine canister. Support the  bottom of the inhaler with your thumb.  6. Breathe out normally and as completely as possible, away from the inhaler.  7. Either place the inhaler between your teeth and close your lips tightly around the mouthpiece, or hold the inhaler 1-2 inches (2.5-5 cm) away from your open mouth. Keep your tongue down out of the way. If you are unsure which technique to use, ask your health care provider.  8. Press the canister down with your index finger to release the medicine, then inhale deeply and slowly through your mouth (not your nose) until your lungs are completely filled. Inhaling should take 4-6 seconds.  9. Hold the medicine in your lungs for 5-10 seconds (10 seconds is best). This helps the medicine get into the small airways of your lungs.  10. With your lips in a tight [place] (pursed), breathe out slowly.  11. Repeat steps 3-10 until you have taken the number of puffs that your health care provider directed. Wait about 1 minute between puffs or as directed.  12. Put the cap on the inhaler.  13. If you are using a steroid inhaler, rinse your mouth with water, gargle, and spit out the water. Do not swallow the water.  How to use a metered dose inhaler with a spacer    1. Remove the cap from the inhaler.  2. If you are using the inhaler for the first time, shake it for 5 seconds, turn it away from your face, then release 4 puffs into the air. This is called priming.  3. Shake the inhaler for 5 seconds.  4. Place the open end of the spacer onto the inhaler mouthpiece.  5. Position the inhaler so the top of the canister faces up and the spacer mouthpiece faces you.  6. Put your index finger on the top of the medicine canister. Support the bottom of the inhaler and the spacer with your thumb.  7. Breathe out normally and as completely as possible, away from the spacer.  8. Place the spacer between your teeth and close your lips tightly around it. Keep your tongue down out of the way.  9. Press the canister down with  your index finger to release the medicine, then inhale deeply and slowly through your mouth (not your nose) until your lungs are completely filled. Inhaling should take 4-6 seconds.  10. Hold the medicine in your lungs for 5-10 seconds (10 seconds is best). This helps the medicine get into the small airways of your lungs.  11. With your lips in a tight [place] (pursed), breathe out slowly.  12. Repeat steps 3-11 until you have taken the number of puffs that your health care provider directed. Wait about 1 minute between puffs or as directed.  13. Remove the spacer from the inhaler and put the cap on the inhaler.  14. If you are using a steroid inhaler, rinse your mouth with water, gargle, and spit out the water. Do not swallow the water.  Follow these instructions at home:  · Take your inhaled medicine only as told by your health care provider. Do not use the inhaler more than directed by your health care provider.  · Keep all follow-up visits as told by your health care provider. This is important.  · If your inhaler has a counter, you can check it to determine how full your inhaler is. If your inhaler does not have a counter, ask your health care provider when you will need to refill your inhaler and write the refill date on a calendar or on your inhaler canister. Note that you cannot know when an inhaler is empty by shaking it.  · Follow directions on the package insert for care and cleaning of your inhaler and spacer.  Contact a health care provider if:  · Symptoms are only partially relieved with your inhaler.  · You are having trouble using your inhaler.  · You have an increase in phlegm.  · You have headaches.  Get help right away if:  · You feel little or no relief after using your inhaler.  · You have dizziness.  · You have a fast heart rate.  · You have chills or a fever.  · You have night sweats.  · There is blood in your phlegm.  Summary  · A metered dose inhaler is a handheld device for taking medicine  that must be breathed into the lungs (inhaled).  · The medicine is delivered by pushing down on a metal canister to release a preset amount of spray and medicine.  · Each device contains the amount of medicine that is needed for a preset number of uses (inhalations).  This information is not intended to replace advice given to you by your health care provider. Make sure you discuss any questions you have with your health care provider.  Document Released: 12/18/2006 Document Revised: 07/09/2018 Document Reviewed: 11/07/2017  [employer] Interactive Patient Education © 2020 [employer]    Cough, Adult    Coughing is a reflex that clears your throat and your airways. Coughing helps to heal and protect your lungs. It is normal to cough occasionally, but a cough that happens with other symptoms or lasts a long time may be a sign of a condition that needs treatment. A cough may last only 2-3 weeks (acute), or it may last longer than 8 weeks (chronic).  What are the causes?  Coughing is commonly caused by:  · Breathing in substances that irritate your lungs.  · A viral or bacterial respiratory infection.  · Allergies.  · Asthma.  · Postnasal drip.  · Smoking.  · Acid backing up from the stomach into the esophagus (gastroesophageal reflux).  · Certain medicines.  · Chronic lung problems, including COPD (or rarely, lung cancer).  · Other medical conditions such as heart failure.  Follow these instructions at home:  Pay attention to any changes in your symptoms. Take these actions to help with your discomfort:  · Take medicines only as told by your health care provider.  ? If you were prescribed an antibiotic medicine, take it as told by your health care provider. Do not stop taking the antibiotic even if you start to feel better.  ? Talk with your health care provider before you take a cough suppressant medicine.  · Drink enough fluid to keep your urine clear or pale yellow.  · If the air is dry, use a cold steam vaporizer  or humidifier in your bedroom or your home to help loosen secretions.  · Avoid anything that causes you to cough at work or at home.  · If your cough is worse at night, try sleeping in a semi-upright position.  · Avoid cigarette smoke. If you smoke, quit smoking. If you need help quitting, ask your health care provider.  · Avoid caffeine.  · Avoid alcohol.  · Rest as needed.  Contact a health care provider if:  · You have new symptoms.  · You cough up pus.  · Your cough does not get better after 2-3 weeks, or your cough gets worse.  · You cannot control your cough with suppressant medicines and you are losing sleep.  · You develop pain that is getting worse or pain that is not controlled with pain medicines.  · You have a fever.  · You have unexplained weight loss.  · You have night sweats.  Get help right away if:  · You cough up blood.  · You have difficulty breathing.  · Your heartbeat is very fast.  This information is not intended to replace advice given to you by your health care provider. Make sure you discuss any questions you have with your health care provider.  Document Released: 06/15/2012 Document Revised: 05/25/2017 Document Reviewed: 02/24/2016  [employer] Interactive Patient Education © 2019 [employer]    Hypothermia  Hypothermia is a drop in body temperature to 95°F (35°C) or lower. The average body temperature is between 97°F (36.1°C) and 99°F (37.2°C). Hypothermia is life-threatening because the body's organs cannot work properly when the body's temperature is too low.  What are the causes?  This condition may be caused by:  · Being in cold weather or a cool room without being dressed warmly enough.  · Getting wet, such as from rain or from falling into cool water.  · Medical conditions that affect body temperature, such as hypothyroidism, diabetes, or Parkinson disease.  · Medicines that affect the body's ability to control temperature.  What increases the risk?  This condition is more likely to  develop in:  · People who are outside for long periods of time, such as hikers, hunters, or homeless people.  · People who use drugs.  · People who have difficulty moving around (impaired mobility).  · Babies.  · Elderly adults.  · People who have a medical condition that affects body temperature.  · People who take medicine that affects the body's ability to control temperature.  · People who drink too much alcohol or who drink it too often.  What are the signs or symptoms?  At first, symptoms of hypothermia may include:  · Shivering. Shivering may happen early in hypothermia and may stop as the condition gets worse. Hypothermia does not always cause shivering.  · Slow thinking, speech, and response time.  · Sleepiness.  · Confusion.  · Pale skin.  · Puffy or swollen face.  · Numbness.  · In infants, skin that is red and cold.  As hypothermia gets worse, the following symptoms may develop:  · Uncoordinated movements.  · Slow, shallow breathing.  · Slow, weak pulse.  · Loss of consciousness.  How is this diagnosed?  This condition is diagnosed by taking your temperature. You may also have tests, such as:  · Blood tests.  · Glucose test.  · Urine tests.  · Liver tests.  · Heart tests, such as an electrocardiogram (ECG).  How is this treated?  It is important to seek treatment as soon as possible. Hypothermia is an emergency that needs to be treated in a hospital. Treatment for hypothermia may include:  · Receiving IV fluids.  · Receiving warm, humidified oxygen through nasal tubing (nasal cannula), an oxygen mask, or a breathing tube.  · Rewarming the blood by removing it, passing it through a hemodialysis machine, and returning it to the body at gradually increasing temperatures.  · Receiving a warm solution through a small tube that goes into the stomach, bladder, or intestine (cavity lavage).  How is this prevented?    · If you have an increased risk of developing hypothermia, keep your home at 68°F (20°C) or  warmer.  · Stay alert for dangerous weather situations and plan accordingly. Keep warm items in your house and car and observe safety precautions from government or weather officials.  · Get out of cold water right away, and replace wet clothing with dry clothing as soon as possible.  · Do not ignore signs that you are cold, such as shivering, cold feet and hands, and numbness.  · If you know you will be in a cold environment:  ? Wear layers of clothing.  ? Do not drink alcohol. Alcohol causes your blood vessels to get larger (dilate) which makes you lose body heat.  ? Cover up with blankets.  ? Wear a waterproof jacket if you are outside in the rain or snow.  Get help right away if:  · You develop symptoms of hypothermia.  · Hypothermia symptoms get worse or do not get better.  This information is not intended to replace advice given to you by your health care provider. Make sure you discuss any questions you have with your health care provider.  Document Released: 06/07/2011 Document Revised: 09/25/2017 Document Reviewed: 09/25/2017  [employer] Interactive Patient Education © 2020 [employer]

## 2020-05-01 ENCOUNTER — TELEPHONE (OUTPATIENT)
Dept: INTERNAL MEDICINE | Facility: CLINIC | Age: 30
End: 2020-05-01

## 2020-05-01 NOTE — TELEPHONE ENCOUNTER
"Called, discussed cough with patient.  He has recently been quarantined, was exposed to COVID 19 & had cough, chills, headache.  COVID test negative, CXR negative.  He states he has had a cough for over 6 months, \"asthma is getting worse\".  He has been using symbicort inhaler as prescribed BID, uses albuterol inhaler prn wheezing for a month.  Cough is productive of clear to white mucus.  Denies fever, chills, decreased appetite, weight loss, night sweats, hemoptysis, n/v, fatigue.  Advised to take mucinex to thin mucus, & to cough mucus up and spit out.  Will schedule appointment to follow up, either by video visit or in person (if he is not coughing). He will go to [facility] or ED if cough fails to improve or worsens, as clinic restricts patients with cough from being seen in office at this time.    Advised to monitor BP, he states he will purchase a BP monitor for home use.  Will discuss at follow up appointment.  He states he can feel his heartbeat in his neck without exertion, randomly.  Denies chest pain, syncope, left arm/jaw/back/neck pain, diaphoresis, nausea, vision changes, headache when this occurs. States it has been happening for years.  He has been more anxious than normal.  Binge drinks every other week.  Is aware this is not a healthy coping mechanism. Has taken mood stabilizers, antidepressants & attended counseling in the past.  States he was advised he needed a specialist dealing with near death experiences.  Checked with insurance and copay for said specialist would be $120, which he cannot afford.  No longer attending counseling of any kind.  Referred to psychiatry 6 months ago, did not make appointment.  Will discuss at follow up, per patient request not referred to psychiatry again at this time.  He is working night shift, states he has more availability for appointments now.  Does not want to make an appointment until COVID 19 epidemic restrictions have been lifted.      "

## 2020-05-01 NOTE — TELEPHONE ENCOUNTER
Patient is calling because he needs some advise on what to do about his symptoms. Patient stated that back in 2002 he had the bird flu and he thought that was the worst thing that has happened to him but he stated 4.5 weeks ago he had never felt as sick. Patient went and had a covid-19 test and it was negative. Patient thinks it was a false negative because of how sick he felt. Patient still has a horrific cough, where he is constantly coughing up phlegm and sometime he is throwing up because of how much and it getting stuck in his throat. Patient did go to the ER and had an x ray done and showed some inflammation in his lungs and a history of asthma which he did not even no he had.  Patient stated that his cough is so bad people are constantly giving him dirty looks at work and the  store. Patient needs some advice on what to do to get this under control. Please advise

## 2020-05-06 ENCOUNTER — HOSPITAL ENCOUNTER (EMERGENCY)
Facility: HOSPITAL | Age: 30
Discharge: HOME | End: 2020-05-06
Attending: EMERGENCY MEDICINE | Admitting: EMERGENCY MEDICINE

## 2020-05-06 ENCOUNTER — APPOINTMENT (OUTPATIENT)
Dept: GENERAL RADIOLOGY | Facility: HOSPITAL | Age: 30
End: 2020-05-06

## 2020-05-06 ENCOUNTER — TELEPHONE (OUTPATIENT)
Dept: INTERNAL MEDICINE | Facility: CLINIC | Age: 30
End: 2020-05-06

## 2020-05-06 VITALS
HEIGHT: 72 IN | BODY MASS INDEX: 27.09 KG/M2 | DIASTOLIC BLOOD PRESSURE: 98 MMHG | WEIGHT: 200 LBS | SYSTOLIC BLOOD PRESSURE: 149 MMHG | HEART RATE: 94 BPM | TEMPERATURE: 98.5 F | OXYGEN SATURATION: 98 % | RESPIRATION RATE: 18 BRPM

## 2020-05-06 DIAGNOSIS — R05.9 COUGH: Primary | ICD-10-CM

## 2020-05-06 LAB
ALBUMIN SERPL-MCNC: 5 G/DL (ref 3.5–5.2)
ALBUMIN/GLOB SERPL: 1.7 G/DL
ALP SERPL-CCNC: 94 U/L (ref 39–117)
ALT SERPL W P-5'-P-CCNC: 62 U/L (ref 1–41)
ANION GAP SERPL CALCULATED.3IONS-SCNC: 13.9 MMOL/L (ref 5–15)
AST SERPL-CCNC: 69 U/L (ref 1–40)
BASOPHILS # BLD AUTO: 0.03 10*3/MM3 (ref 0–0.2)
BASOPHILS NFR BLD AUTO: 0.7 % (ref 0–1.5)
BILIRUB SERPL-MCNC: 0.7 MG/DL (ref 0.2–1.2)
BUN BLD-MCNC: 11 MG/DL (ref 6–20)
BUN/CREAT SERPL: 13.8 (ref 7–25)
CALCIUM SPEC-SCNC: 10.5 MG/DL (ref 8.6–10.5)
CHLORIDE SERPL-SCNC: 99 MMOL/L (ref 98–107)
CO2 SERPL-SCNC: 26.1 MMOL/L (ref 22–29)
CREAT BLD-MCNC: 0.8 MG/DL (ref 0.76–1.27)
D DIMER PPP FEU-MCNC: 0.26 MCGFEU/ML (ref 0–0.57)
DEPRECATED RDW RBC AUTO: 38.4 FL (ref 37–54)
EOSINOPHIL # BLD AUTO: 0.13 10*3/MM3 (ref 0–0.4)
EOSINOPHIL NFR BLD AUTO: 3.2 % (ref 0.3–6.2)
ERYTHROCYTE [DISTWIDTH] IN BLOOD BY AUTOMATED COUNT: 11.9 % (ref 12.3–15.4)
GFR SERPL CREATININE-BSD FRML MDRD: 115 ML/MIN/1.73
GLOBULIN UR ELPH-MCNC: 2.9 GM/DL
GLUCOSE BLD-MCNC: 98 MG/DL (ref 65–99)
HCT VFR BLD AUTO: 42.1 % (ref 37.5–51)
HGB BLD-MCNC: 15.1 G/DL (ref 13–17.7)
HOLD SPECIMEN: NORMAL
HOLD SPECIMEN: NORMAL
IMM GRANULOCYTES # BLD AUTO: 0.01 10*3/MM3 (ref 0–0.05)
IMM GRANULOCYTES NFR BLD AUTO: 0.2 % (ref 0–0.5)
LYMPHOCYTES # BLD AUTO: 0.87 10*3/MM3 (ref 0.7–3.1)
LYMPHOCYTES NFR BLD AUTO: 21.4 % (ref 19.6–45.3)
MCH RBC QN AUTO: 31.4 PG (ref 26.6–33)
MCHC RBC AUTO-ENTMCNC: 35.9 G/DL (ref 31.5–35.7)
MCV RBC AUTO: 87.5 FL (ref 79–97)
MONOCYTES # BLD AUTO: 0.39 10*3/MM3 (ref 0.1–0.9)
MONOCYTES NFR BLD AUTO: 9.6 % (ref 5–12)
NEUTROPHILS # BLD AUTO: 2.63 10*3/MM3 (ref 1.7–7)
NEUTROPHILS NFR BLD AUTO: 64.9 % (ref 42.7–76)
NRBC BLD AUTO-RTO: 0 /100 WBC (ref 0–0.2)
PLATELET # BLD AUTO: 219 10*3/MM3 (ref 140–450)
PMV BLD AUTO: 9.4 FL (ref 6–12)
POTASSIUM BLD-SCNC: 3.8 MMOL/L (ref 3.5–5.2)
PROT SERPL-MCNC: 7.9 G/DL (ref 6–8.5)
RBC # BLD AUTO: 4.81 10*6/MM3 (ref 4.14–5.8)
SODIUM BLD-SCNC: 139 MMOL/L (ref 136–145)
TROPONIN T SERPL-MCNC: <0.01 NG/ML (ref 0–0.03)
WBC NRBC COR # BLD: 4.06 10*3/MM3 (ref 3.4–10.8)
WHOLE BLOOD HOLD SPECIMEN: NORMAL
WHOLE BLOOD HOLD SPECIMEN: NORMAL

## 2020-05-06 PROCEDURE — 84484 ASSAY OF TROPONIN QUANT: CPT | Performed by: PHYSICIAN ASSISTANT

## 2020-05-06 PROCEDURE — 99283 EMERGENCY DEPT VISIT LOW MDM: CPT

## 2020-05-06 PROCEDURE — 85025 COMPLETE CBC W/AUTO DIFF WBC: CPT | Performed by: PHYSICIAN ASSISTANT

## 2020-05-06 PROCEDURE — 85379 FIBRIN DEGRADATION QUANT: CPT | Performed by: PHYSICIAN ASSISTANT

## 2020-05-06 PROCEDURE — 80053 COMPREHEN METABOLIC PANEL: CPT | Performed by: PHYSICIAN ASSISTANT

## 2020-05-06 PROCEDURE — 93005 ELECTROCARDIOGRAM TRACING: CPT

## 2020-05-06 PROCEDURE — 71045 X-RAY EXAM CHEST 1 VIEW: CPT

## 2020-05-06 RX ORDER — SODIUM CHLORIDE 0.9 % (FLUSH) 0.9 %
10 SYRINGE (ML) INJECTION AS NEEDED
Status: DISCONTINUED | OUTPATIENT
Start: 2020-05-06 | End: 2020-05-06 | Stop reason: HOSPADM

## 2020-05-06 RX ORDER — AMLODIPINE BESYLATE 5 MG/1
5 TABLET ORAL DAILY
Qty: 30 TABLET | Refills: 0 | Status: SHIPPED | OUTPATIENT
Start: 2020-05-06 | End: 2020-06-02

## 2020-05-06 RX ORDER — AMLODIPINE BESYLATE 5 MG/1
5 TABLET ORAL ONCE
Status: COMPLETED | OUTPATIENT
Start: 2020-05-06 | End: 2020-05-06

## 2020-05-06 RX ADMIN — AMLODIPINE BESYLATE 5 MG: 5 TABLET ORAL at 14:33

## 2020-05-06 NOTE — ED PROVIDER NOTES
"Subjective   This patient states he has had a cough that is mostly dry nonproductive for over 7 months.  He states he was seen 1 month ago at urgent treatment after a positive exposure to someone with known COVID-19.  He had a COVID swab which resulted negative but was also found to have hypertension was started on lisinopril at that time.  He states since he was started on lisinopril his cough has worsened.  He states in the past several weeks he has had 3 syncopal episodes that always followed a intense coughing spell.  He also states he was started on albuterol and steroid inhaler a month ago for asthma.  He was seen in this facility not long after being tested for COVID-19 and had a chest x-ray which is normal was sent home back to self quarantine.  He comes in today because he called his PCP and advised him to come to the ER to make sure there is nothing \"acute going on.\"  He denies any chest pain but states he has had some burning/tightness in his chest.  He does smoke a few cigarettes a day.  He states a month ago he did have a fever as high as 102.6 but no recent documented fevers.          Review of Systems   Constitutional: Negative.    HENT: Negative.    Eyes: Negative.    Respiratory: Positive for cough and shortness of breath.    Cardiovascular: Negative.    Gastrointestinal: Negative.    Genitourinary: Negative.    Musculoskeletal: Negative.    Skin: Negative.    Allergic/Immunologic: Negative.    Neurological: Positive for syncope.   Psychiatric/Behavioral: Negative.    All other systems reviewed and are negative.      Past Medical History:   Diagnosis Date   • Asthma    • Bipolar 1 disorder (CMS/HCC)    • Depression    • Heart disease    • Heart murmur        Allergies   Allergen Reactions   • Marijuana (Cannabis Sativa) Swelling       History reviewed. No pertinent surgical history.    Family History   Problem Relation Age of Onset   • Hypertension Mother    • Arthritis Father    • Hypertension " Father    • Hyperlipidemia Father    • Hypertension Maternal Aunt    • Hypertension Maternal Uncle    • Liver cancer Maternal Uncle    • Hypertension Paternal Aunt    • Diabetes Paternal Uncle    • Hypertension Paternal Uncle    • Hypertension Maternal Grandmother    • Hypertension Maternal Grandfather    • Arthritis Paternal Grandmother    • Hypertension Paternal Grandmother    • Arthritis Paternal Grandfather    • Hypertension Paternal Grandfather        Social History     Socioeconomic History   • Marital status: Single     Spouse name: Not on file   • Number of children: Not on file   • Years of education: Not on file   • Highest education level: Not on file   Tobacco Use   • Smoking status: Current Some Day Smoker     Packs/day: 0.50     Types: Cigarettes   • Smokeless tobacco: Never Used   Substance and Sexual Activity   • Alcohol use: Yes     Comment: social   • Drug use: No   • Sexual activity: Defer           Objective   Physical Exam   Constitutional: He appears well-developed and well-nourished.   HENT:   Head: Normocephalic and atraumatic.   Eyes: EOM are normal.   Neck: Normal range of motion.   Cardiovascular: Normal rate, regular rhythm and normal heart sounds.   No murmur heard.  Pulmonary/Chest: Effort normal and breath sounds normal. He has no decreased breath sounds. He has no wheezes. He has no rhonchi. He has no rales.   Musculoskeletal: Normal range of motion.        Right lower leg: Normal.        Left lower leg: Normal.   Neurological: He is alert.   Skin: Skin is warm and dry.   Psychiatric: He has a normal mood and affect. His behavior is normal.   Nursing note and vitals reviewed.      Procedures           ED Course  ED Course as of May 06 1524   Wed May 06, 2020   1437 D-Dimer, Quant: 0.26 [TM]   1450 Troponin T: <0.010 [TM]   1450 D-Dimer, Quant: 0.26 [TM]   1521 EKG interpreted by me reveals sinus rhythm with rate of 100 bpm.  There are no acute ST segment or T wave changes.  This is a  "normal-appearing EKG.    [TB]      ED Course User Index  [TB] Marisel Peres MD  [TM] Kurt Valderrama PA-C                                           [facility]  Patient's work appears reassuring which includes normal troponin, d-dimer, chest x-ray, EKG and basic labs.  He does state in the past he follow with cardiology but due to \"the ignorance of youth\" he never followed back up as scheduled.  Will switch from lisinopril to losartan given worsening cough over the past month possibly due to recently starting an ACE inhibitor.  We will have him monitor his blood pressure and follow-up with cards.  Strict return to care precautions given.  Final diagnoses:   Cough            Kurt Valderrama PA-C  05/06/20 1524    "

## 2020-05-07 ENCOUNTER — EPISODE CHANGES (OUTPATIENT)
Dept: CASE MANAGEMENT | Facility: OTHER | Age: 30
End: 2020-05-07

## 2020-05-27 ENCOUNTER — E-VISIT (OUTPATIENT)
Dept: INTERNAL MEDICINE | Facility: CLINIC | Age: 30
End: 2020-05-27

## 2020-05-27 NOTE — PROGRESS NOTES
Femi Galvez   1990  [id number]    I have reviewed the e-Visit questionnaire and patient's answers, my assessment and plan are as follows:    HPI  Patient submitted e-visit related to redness of both eyes for 1 to 4 days.  Denies temperature over 100.4, recent swimming, injury to eye, exposure to substance that could cause eye irritation.  He does not wear contacts. With further communication, Femi relayed he has had a fever of 101.2, remains around 99.  He needs a return to work note. Advised to go to [facility], as this cannot be provided with relayed symptoms without a physical examination.        He has had a cough since at least 4/8/2020.  He was tested for COVID-19, negative.  Remained in quarantine for 2 weeks, continues to have cough.    ROS  Review of Systems       There are no diagnoses linked to this encounter.    Any medications prescribed have been sent electronically to   [id number] [place], [place] - [address] AT [facility] [address] - [phone] PH - [phone] FX  [address] [place] [address]  Phone: [phone] Fax: [phone]    [id number] [place], [place] - [address] - [phone] PH - [phone] FX  [address] [place] [address]  Phone: [phone] Fax: [phone]    [id number] [place], [place] - [address] AT [facility] [address] & - [phone] PH - [phone] FX  [address] [facility] [address]  Phone: [phone] Fax: [phone]        Codi Og, RADHA  05/27/20  2:36 PM    Time Documentation:

## 2020-05-28 ENCOUNTER — HOSPITAL ENCOUNTER (EMERGENCY)
Facility: HOSPITAL | Age: 30
Discharge: HOME | End: 2020-05-28
Attending: EMERGENCY MEDICINE | Admitting: EMERGENCY MEDICINE

## 2020-05-28 VITALS
WEIGHT: 200 LBS | SYSTOLIC BLOOD PRESSURE: 155 MMHG | TEMPERATURE: 98.7 F | OXYGEN SATURATION: 98 % | DIASTOLIC BLOOD PRESSURE: 99 MMHG | HEIGHT: 72 IN | HEART RATE: 94 BPM | BODY MASS INDEX: 27.09 KG/M2 | RESPIRATION RATE: 18 BRPM

## 2020-05-28 DIAGNOSIS — I10 HYPERTENSION, UNSPECIFIED TYPE: Primary | ICD-10-CM

## 2020-05-28 DIAGNOSIS — F10.10 ALCOHOL ABUSE: ICD-10-CM

## 2020-05-28 LAB
ALBUMIN SERPL-MCNC: 4.9 G/DL (ref 3.5–5.2)
ALBUMIN/GLOB SERPL: 1.8 G/DL
ALP SERPL-CCNC: 96 U/L (ref 39–117)
ALT SERPL W P-5'-P-CCNC: 44 U/L (ref 1–41)
ANION GAP SERPL CALCULATED.3IONS-SCNC: 16.4 MMOL/L (ref 5–15)
AST SERPL-CCNC: 40 U/L (ref 1–40)
BASOPHILS # BLD AUTO: 0.03 10*3/MM3 (ref 0–0.2)
BASOPHILS NFR BLD AUTO: 0.7 % (ref 0–1.5)
BILIRUB SERPL-MCNC: 0.6 MG/DL (ref 0.2–1.2)
BUN BLD-MCNC: 10 MG/DL (ref 6–20)
BUN/CREAT SERPL: 12.3 (ref 7–25)
CALCIUM SPEC-SCNC: 10.3 MG/DL (ref 8.6–10.5)
CHLORIDE SERPL-SCNC: 98 MMOL/L (ref 98–107)
CO2 SERPL-SCNC: 24.6 MMOL/L (ref 22–29)
CREAT BLD-MCNC: 0.81 MG/DL (ref 0.76–1.27)
DEPRECATED RDW RBC AUTO: 38.6 FL (ref 37–54)
EOSINOPHIL # BLD AUTO: 0.09 10*3/MM3 (ref 0–0.4)
EOSINOPHIL NFR BLD AUTO: 2 % (ref 0.3–6.2)
ERYTHROCYTE [DISTWIDTH] IN BLOOD BY AUTOMATED COUNT: 12.2 % (ref 12.3–15.4)
GFR SERPL CREATININE-BSD FRML MDRD: 113 ML/MIN/1.73
GLOBULIN UR ELPH-MCNC: 2.7 GM/DL
GLUCOSE BLD-MCNC: 119 MG/DL (ref 65–99)
HCT VFR BLD AUTO: 43.8 % (ref 37.5–51)
HGB BLD-MCNC: 15.7 G/DL (ref 13–17.7)
IMM GRANULOCYTES # BLD AUTO: 0.01 10*3/MM3 (ref 0–0.05)
IMM GRANULOCYTES NFR BLD AUTO: 0.2 % (ref 0–0.5)
LYMPHOCYTES # BLD AUTO: 1.05 10*3/MM3 (ref 0.7–3.1)
LYMPHOCYTES NFR BLD AUTO: 23.4 % (ref 19.6–45.3)
MCH RBC QN AUTO: 31.3 PG (ref 26.6–33)
MCHC RBC AUTO-ENTMCNC: 35.8 G/DL (ref 31.5–35.7)
MCV RBC AUTO: 87.3 FL (ref 79–97)
MONOCYTES # BLD AUTO: 0.5 10*3/MM3 (ref 0.1–0.9)
MONOCYTES NFR BLD AUTO: 11.2 % (ref 5–12)
NEUTROPHILS # BLD AUTO: 2.8 10*3/MM3 (ref 1.7–7)
NEUTROPHILS NFR BLD AUTO: 62.5 % (ref 42.7–76)
NRBC BLD AUTO-RTO: 0 /100 WBC (ref 0–0.2)
PLATELET # BLD AUTO: 235 10*3/MM3 (ref 140–450)
PMV BLD AUTO: 8.9 FL (ref 6–12)
POTASSIUM BLD-SCNC: 3.7 MMOL/L (ref 3.5–5.2)
PROT SERPL-MCNC: 7.6 G/DL (ref 6–8.5)
RBC # BLD AUTO: 5.02 10*6/MM3 (ref 4.14–5.8)
SODIUM BLD-SCNC: 139 MMOL/L (ref 136–145)
WBC NRBC COR # BLD: 4.48 10*3/MM3 (ref 3.4–10.8)

## 2020-05-28 PROCEDURE — 99283 EMERGENCY DEPT VISIT LOW MDM: CPT

## 2020-05-28 PROCEDURE — 80053 COMPREHEN METABOLIC PANEL: CPT | Performed by: EMERGENCY MEDICINE

## 2020-05-28 PROCEDURE — 85025 COMPLETE CBC W/AUTO DIFF WBC: CPT | Performed by: EMERGENCY MEDICINE

## 2020-05-28 RX ORDER — CHLORDIAZEPOXIDE HYDROCHLORIDE 25 MG/1
CAPSULE, GELATIN COATED ORAL
Qty: 15 CAPSULE | Refills: 0 | OUTPATIENT
Start: 2020-05-28 | End: 2020-10-20

## 2020-05-28 RX ORDER — HYDROXYZINE HYDROCHLORIDE 25 MG/1
25 TABLET, FILM COATED ORAL ONCE
Status: COMPLETED | OUTPATIENT
Start: 2020-05-28 | End: 2020-05-28

## 2020-05-28 RX ORDER — SODIUM CHLORIDE 0.9 % (FLUSH) 0.9 %
10 SYRINGE (ML) INJECTION AS NEEDED
Status: DISCONTINUED | OUTPATIENT
Start: 2020-05-28 | End: 2020-05-28 | Stop reason: HOSPADM

## 2020-05-28 RX ADMIN — SODIUM CHLORIDE 1000 ML: 9 INJECTION, SOLUTION INTRAVENOUS at 00:57

## 2020-05-28 RX ADMIN — HYDROXYZINE HYDROCHLORIDE 25 MG: 25 TABLET, FILM COATED ORAL at 00:50

## 2020-05-29 ENCOUNTER — TELEPHONE (OUTPATIENT)
Dept: INTERNAL MEDICINE | Facility: CLINIC | Age: 30
End: 2020-05-29

## 2020-06-02 ENCOUNTER — CONSULT (OUTPATIENT)
Dept: CARDIOLOGY | Facility: CLINIC | Age: 30
End: 2020-06-02

## 2020-06-02 VITALS
OXYGEN SATURATION: 100 % | WEIGHT: 203 LBS | DIASTOLIC BLOOD PRESSURE: 92 MMHG | HEART RATE: 69 BPM | SYSTOLIC BLOOD PRESSURE: 124 MMHG | HEIGHT: 72 IN | BODY MASS INDEX: 27.5 KG/M2

## 2020-06-02 DIAGNOSIS — R55 SYNCOPE AND COLLAPSE: ICD-10-CM

## 2020-06-02 DIAGNOSIS — I10 ESSENTIAL HYPERTENSION: Primary | ICD-10-CM

## 2020-06-02 DIAGNOSIS — F41.9 ANXIETY: ICD-10-CM

## 2020-06-02 DIAGNOSIS — Z72.0 TOBACCO USE: ICD-10-CM

## 2020-06-02 DIAGNOSIS — F10.10 ETOH ABUSE: ICD-10-CM

## 2020-06-02 PROCEDURE — 99244 OFF/OP CNSLTJ NEW/EST MOD 40: CPT | Performed by: INTERNAL MEDICINE

## 2020-06-02 NOTE — PROGRESS NOTES
[facility] Cardiology OP Consult Note    Femi Galvez  [id number]  06/02/2020    Referred By: RADHA Day    Chief Complaint: Hypertension    History of Present Illness:   Mr. Femi Galvez is a 29 y.o. male who presents to the Cardiology Clinic for evaluation of hypertension.  The patient has a past medical history significant for asthma, depression, and bipolar disorder.  He does not have any significant past cardiac history.  He also has a medical history significant for EtOH abuse, reportedly drinking 8 to 1016 ounce [place] best beers per day.  His recent medical history significant for presentation to the emergency department on 5/28/2020 for evaluation of hypertension and tachycardia Upon further questioning, the patient reports significant emotional distress at the time of his ED presentation due to the ending of a long-term relationship.  At that time, he reports his systolic blood pressure was greater than 200 mmHg.  He reports mild chest pain, palpitations, and shortness of breath during his hypertension.  By the time of presentation emergency department, his blood pressure was trending down..  At that time, an ECG and general laboratory evaluation was unremarkable.  He was subsequent started on low-dose metoprolol for BP control.  He was also given a short dose of Librium for EtOH cessation.  Since that time, he has not had any recurrent episodes of severe hypertension.  No episodes of palpitations or tachycardia.  No exertional chest pain or chest discomfort.  He denies significant exertional dyspnea.  He does, however, report rare episodes of syncope.  Reports the last episodes occurred in approximately 12/19.  At that time, he reports he was withdrawing from EtOH and had approximately 3 episodes where he became diaphoretic, tachycardic, and subsequently syncopized.  Following resolution of his EtOH withdrawal symptoms, he did not have any current episodes.   Since that time, he has had several episodes of mild EtOH withdrawal but no recurrent syncope.  No other specific complaints at this time.    Past Cardiac Testin. Last Coronary Angio: None  2. Prior Stress Testing: None  3. Last Echo: Remote, report unavailable  4. Prior Holter Monitor: Remote, report unavailable    Review of Systems:   Review of Systems   Constitutional: Negative for activity change, appetite change, chills, diaphoresis, fatigue, fever, unexpected weight gain and unexpected weight loss.   Eyes: Negative for blurred vision and double vision.   Respiratory: Positive for shortness of breath. Negative for cough, chest tightness and wheezing.    Cardiovascular: Positive for chest pain and palpitations. Negative for leg swelling.   Gastrointestinal: Negative for abdominal pain, anal bleeding, blood in stool and GERD.   Endocrine: Negative for cold intolerance and heat intolerance.   Genitourinary: Negative for hematuria.   Musculoskeletal: Positive for neck pain.   Neurological: Negative for dizziness, syncope, weakness and light-headedness.   Hematological: Does not bruise/bleed easily.   Psychiatric/Behavioral: Negative for depressed mood and stress. The patient is not nervous/anxious.        Past Medical History:   Past Medical History:   Diagnosis Date   • Asthma    • Bipolar 1 disorder (CMS/HCC)    • Bipolar disorder (CMS/HCC)    • Depression    • GERD (gastroesophageal reflux disease)    • Heart disease    • Heart murmur    • Hypertension        Past Surgical History: History reviewed. No pertinent surgical history.    Family History:   Family History   Problem Relation Age of Onset   • Hypertension Mother    • Arthritis Father    • Hypertension Father    • Hyperlipidemia Father    • Hypertension Maternal Aunt    • Cancer Maternal Aunt    • Hypertension Maternal Uncle    • Liver cancer Maternal Uncle    • Hypertension Paternal Aunt    • Diabetes Paternal Uncle    • Hypertension Paternal Uncle  "   • Hypertension Maternal Grandmother    • Hypertension Maternal Grandfather    • Arthritis Paternal Grandmother    • Hypertension Paternal Grandmother    • Arthritis Paternal Grandfather    • Hypertension Paternal Grandfather    • Liver disease Other    • Mental illness Other    • Obesity Other    • Stroke Other        Social History:   Social History     Socioeconomic History   • Marital status: Single     Spouse name: Not on file   • Number of children: Not on file   • Years of education: Not on file   • Highest education level: Not on file   Tobacco Use   • Smoking status: Current Some Day Smoker     Packs/day: 0.50     Types: Cigarettes   • Smokeless tobacco: Never Used   • Tobacco comment: 118 oz beer a night x 4 days a week    Substance and Sexual Activity   • Alcohol use: Yes     Comment: Daily    • Drug use: No   • Sexual activity: Defer       Medications:     Current Outpatient Medications:   •  albuterol sulfate  (90 Base) MCG/ACT inhaler, Inhale 2 puffs Every 4 (Four) Hours As Needed for Wheezing., Disp: 1 inhaler, Rfl: 0  •  chlordiazePOXIDE (LIBRIUM) 25 MG capsule, Day 1 - 50 mg Q6h Day 2 - 25 mg Q6H Day 3 25 mg Q12H Day 4 - 25 mg at night, Disp: 15 capsule, Rfl: 0  •  metoprolol tartrate (LOPRESSOR) 25 MG tablet, Take 0.5 tablets by mouth 2 (Two) Times a Day., Disp: 20 tablet, Rfl: 0    Allergies:   Allergies   Allergen Reactions   • Marijuana (Cannabis Sativa) Swelling       Physical Exam:  Vital Signs:   Vitals:    06/02/20 0844 06/02/20 0851 06/02/20 0852   BP: 122/80 140/100 124/92   BP Location: Right arm Left arm Left arm   Patient Position: Sitting Sitting Standing   Cuff Size: Adult Adult Adult   Pulse: 69     SpO2: 100%     Weight: 92.1 kg (203 lb)     Height: 182.9 cm (72\")         Physical Exam   Constitutional: He is oriented to person, place, and time. He appears well-developed and well-nourished. No distress.   HENT:   Head: Normocephalic and atraumatic.   Moist Mucous Membranes. "    Eyes: Pupils are equal, round, and reactive to light. EOM are normal. No scleral icterus.   Neck: No tracheal deviation present.   Cardiovascular: Normal rate, regular rhythm, normal heart sounds and intact distal pulses. Exam reveals no gallop and no friction rub.   No murmur heard.  Normal JVD.   Pulmonary/Chest: Effort normal and breath sounds normal. No stridor. No respiratory distress. He has no wheezes. He has no rales. He exhibits no tenderness.   Abdominal: Soft. Bowel sounds are normal. He exhibits no distension. There is no tenderness. There is no rebound and no guarding.   Musculoskeletal: Normal range of motion. He exhibits no edema.   Lymphadenopathy:     He has no cervical adenopathy.   Neurological: He is alert and oriented to person, place, and time.   Skin: Skin is warm and dry. He is not diaphoretic. No erythema.   Psychiatric: He has a normal mood and affect. His behavior is normal.       Results Review:   I reviewed the patient's new clinical results.  I personally viewed and interpreted the patient's EKG/Telemetry data    ECG 5/6/2020: Sinus tachycardia at 100 bpm.  Normal axis.  HI interval 158 ms.  QTc 387 ms.  Normal ECG other than rate.      Assessment / Plan:     1.  Essential hypertension  --Resents today primarily for further management of hypertension  --Recent isolated episode of hypertension likely driven by underlying emotional stress and anxiety as well as EtOH withdrawal  --BP today is well controlled with low-dose metoprolol  --Will continue current dose of metoprolol  --Follow-up in 3 months to reevaluate BP at that time    2.  Syncope  --The patient reports rare episodes of syncope, with the last being in 12/19  --Syncopal episodes appear to occur in the setting of EtOH withdrawal  --Low suspicion for underlying cardiac etiology for syncope  --Reportedly had outpatient cardiac monitoring in 2014 during which time he had a syncopal episode, will obtain records for  review  --Echocardiogram to underlying structural or valvular abnormalities (also reports a history of cardiac murmur)    3.  Anxiety  --Likely contributing to hypertension and current symptoms  --Management per PCP    4.  ETOH abuse  --Advised complete cessation    5.  Chronic tobacco use   --Cessation advised        Preventative Cardiology:   Tobacco Cessation: Cessation Counseling Provided   Obstructive Sleep Apnea Screening: N/A  AAA Screening: N/A  Peripheral Arterial Disease Screening: N/A    Follow Up:   Return in about 3 months (around 9/2/2020).      Thank you for allowing me to participate in the care of your patient. Please to not hesitate to contact me with additional questions or concerns.     FROYLAN Jalloh MD  Interventional Cardiology   06/02/2020  8:46 AM

## 2020-06-15 DIAGNOSIS — R05.8 PRODUCTIVE COUGH: Primary | ICD-10-CM

## 2020-08-31 RX ORDER — OMEPRAZOLE 40 MG/1
40 CAPSULE, DELAYED RELEASE ORAL DAILY
Qty: 30 CAPSULE | Refills: 1 | Status: SHIPPED | OUTPATIENT
Start: 2020-08-31 | End: 2021-04-30 | Stop reason: SDUPTHER

## 2021-04-30 DIAGNOSIS — I10 ESSENTIAL HYPERTENSION: ICD-10-CM

## 2021-04-30 DIAGNOSIS — F31.9 BIPOLAR AFFECTIVE DISORDER, REMISSION STATUS UNSPECIFIED (HCC): Primary | ICD-10-CM

## 2021-04-30 DIAGNOSIS — J45.31 MILD PERSISTENT ASTHMA WITH EXACERBATION: ICD-10-CM

## 2021-04-30 RX ORDER — ALBUTEROL SULFATE 90 UG/1
2 AEROSOL, METERED RESPIRATORY (INHALATION) EVERY 4 HOURS PRN
Qty: 18 G | Refills: 11 | Status: SHIPPED | OUTPATIENT
Start: 2021-04-30

## 2021-04-30 RX ORDER — OMEPRAZOLE 40 MG/1
40 CAPSULE, DELAYED RELEASE ORAL DAILY
Qty: 90 CAPSULE | Refills: 3 | Status: SHIPPED | OUTPATIENT
Start: 2021-04-30

## 2021-04-30 NOTE — PROGRESS NOTES
Patient needs refills of BP, GERD medications.  Wait for his appointment scheduled today was too long, he had to return to work.  Orders placed for refills and CMP.

## 2021-05-12 ENCOUNTER — OFFICE VISIT (OUTPATIENT)
Dept: INTERNAL MEDICINE | Facility: CLINIC | Age: 31
End: 2021-05-12

## 2021-05-12 VITALS
WEIGHT: 192 LBS | BODY MASS INDEX: 26.01 KG/M2 | HEIGHT: 72 IN | SYSTOLIC BLOOD PRESSURE: 122 MMHG | HEART RATE: 75 BPM | TEMPERATURE: 98.2 F | OXYGEN SATURATION: 99 % | DIASTOLIC BLOOD PRESSURE: 78 MMHG

## 2021-05-12 DIAGNOSIS — F41.9 ANXIETY: ICD-10-CM

## 2021-05-12 DIAGNOSIS — R23.2 HOT FLASHES: ICD-10-CM

## 2021-05-12 DIAGNOSIS — R74.8 ELEVATED ALKALINE PHOSPHATASE LEVEL: ICD-10-CM

## 2021-05-12 DIAGNOSIS — R53.83 FATIGUE, UNSPECIFIED TYPE: ICD-10-CM

## 2021-05-12 DIAGNOSIS — M54.16 LUMBAR BACK PAIN WITH RADICULOPATHY AFFECTING LEFT LOWER EXTREMITY: ICD-10-CM

## 2021-05-12 DIAGNOSIS — Z11.59 ENCOUNTER FOR HEPATITIS C SCREENING TEST FOR LOW RISK PATIENT: ICD-10-CM

## 2021-05-12 DIAGNOSIS — I10 ESSENTIAL HYPERTENSION: ICD-10-CM

## 2021-05-12 DIAGNOSIS — E83.52 HYPERCALCEMIA: ICD-10-CM

## 2021-05-12 DIAGNOSIS — Z00.00 ANNUAL PHYSICAL EXAM: Primary | ICD-10-CM

## 2021-05-12 DIAGNOSIS — Z72.0 TOBACCO USE: ICD-10-CM

## 2021-05-12 PROCEDURE — 99395 PREV VISIT EST AGE 18-39: CPT | Performed by: NURSE PRACTITIONER

## 2021-05-12 RX ORDER — HYDROXYZINE HYDROCHLORIDE 25 MG/1
25 TABLET, FILM COATED ORAL 3 TIMES DAILY PRN
Qty: 90 TABLET | Refills: 1 | Status: SHIPPED | OUTPATIENT
Start: 2021-05-12

## 2021-05-12 RX ORDER — BUSPIRONE HYDROCHLORIDE 5 MG/1
TABLET ORAL
Qty: 90 TABLET | Refills: 1 | Status: SHIPPED | OUTPATIENT
Start: 2021-05-12

## 2021-05-13 LAB
ALBUMIN SERPL-MCNC: 4.9 G/DL (ref 3.5–5.2)
ALBUMIN/GLOB SERPL: 2 G/DL
ALP SERPL-CCNC: 88 U/L (ref 39–117)
ALT SERPL-CCNC: 58 U/L (ref 1–41)
AST SERPL-CCNC: 35 U/L (ref 1–40)
BASOPHILS # BLD AUTO: 0.03 10*3/MM3 (ref 0–0.2)
BASOPHILS NFR BLD AUTO: 0.5 % (ref 0–1.5)
BILIRUB SERPL-MCNC: 0.8 MG/DL (ref 0–1.2)
BUN SERPL-MCNC: 16 MG/DL (ref 6–20)
BUN/CREAT SERPL: 16.8 (ref 7–25)
CALCIUM SERPL-MCNC: 10.6 MG/DL (ref 8.6–10.5)
CHLORIDE SERPL-SCNC: 100 MMOL/L (ref 98–107)
CO2 SERPL-SCNC: 27.2 MMOL/L (ref 22–29)
CREAT SERPL-MCNC: 0.95 MG/DL (ref 0.76–1.27)
EOSINOPHIL # BLD AUTO: 0.13 10*3/MM3 (ref 0–0.4)
EOSINOPHIL NFR BLD AUTO: 2.4 % (ref 0.3–6.2)
ERYTHROCYTE [DISTWIDTH] IN BLOOD BY AUTOMATED COUNT: 12.7 % (ref 12.3–15.4)
GLOBULIN SER CALC-MCNC: 2.4 GM/DL
GLUCOSE SERPL-MCNC: 97 MG/DL (ref 65–99)
HCT VFR BLD AUTO: 46.6 % (ref 37.5–51)
HCV AB S/CO SERPL IA: <0.1 S/CO RATIO (ref 0–0.9)
HGB BLD-MCNC: 16.1 G/DL (ref 13–17.7)
IMM GRANULOCYTES # BLD AUTO: 0.01 10*3/MM3 (ref 0–0.05)
IMM GRANULOCYTES NFR BLD AUTO: 0.2 % (ref 0–0.5)
LYMPHOCYTES # BLD AUTO: 1.18 10*3/MM3 (ref 0.7–3.1)
LYMPHOCYTES NFR BLD AUTO: 21.6 % (ref 19.6–45.3)
MCH RBC QN AUTO: 31.3 PG (ref 26.6–33)
MCHC RBC AUTO-ENTMCNC: 34.5 G/DL (ref 31.5–35.7)
MCV RBC AUTO: 90.5 FL (ref 79–97)
MONOCYTES # BLD AUTO: 0.51 10*3/MM3 (ref 0.1–0.9)
MONOCYTES NFR BLD AUTO: 9.3 % (ref 5–12)
NEUTROPHILS # BLD AUTO: 3.61 10*3/MM3 (ref 1.7–7)
NEUTROPHILS NFR BLD AUTO: 66 % (ref 42.7–76)
NRBC BLD AUTO-RTO: 0 /100 WBC (ref 0–0.2)
PLATELET # BLD AUTO: 292 10*3/MM3 (ref 140–450)
POTASSIUM SERPL-SCNC: 4.2 MMOL/L (ref 3.5–5.2)
PROT SERPL-MCNC: 7.3 G/DL (ref 6–8.5)
RBC # BLD AUTO: 5.15 10*6/MM3 (ref 4.14–5.8)
SODIUM SERPL-SCNC: 137 MMOL/L (ref 136–145)
TSH SERPL DL<=0.005 MIU/L-ACNC: 1.68 UIU/ML (ref 0.27–4.2)
WBC # BLD AUTO: 5.47 10*3/MM3 (ref 3.4–10.8)

## 2021-07-07 ENCOUNTER — TELEPHONE (OUTPATIENT)
Dept: INTERNAL MEDICINE | Facility: CLINIC | Age: 31
End: 2021-07-07

## 2021-07-07 NOTE — TELEPHONE ENCOUNTER
Patient did not complete chest xray ordered on 06/15/2020. This order is too old to be used. May I cancel the order?

## 2021-09-14 ENCOUNTER — LAB (OUTPATIENT)
Dept: LAB | Facility: HOSPITAL | Age: 31
End: 2021-09-14

## 2021-09-14 ENCOUNTER — TRANSCRIBE ORDERS (OUTPATIENT)
Dept: LAB | Facility: HOSPITAL | Age: 31
End: 2021-09-14

## 2021-09-14 DIAGNOSIS — Z20.822 COVID-19 RULED OUT: ICD-10-CM

## 2021-09-14 DIAGNOSIS — Z20.822 COVID-19 RULED OUT: Primary | ICD-10-CM

## 2021-09-14 DIAGNOSIS — M54.50 LOW BACK PAIN, UNSPECIFIED BACK PAIN LATERALITY, UNSPECIFIED CHRONICITY, UNSPECIFIED WHETHER SCIATICA PRESENT: Primary | ICD-10-CM

## 2021-09-14 PROCEDURE — U0004 COV-19 TEST NON-CDC HGH THRU: HCPCS

## 2021-09-15 LAB — SARS-COV-2 RNA NOSE QL NAA+PROBE: NOT DETECTED

## 2021-09-16 ENCOUNTER — TELEPHONE (OUTPATIENT)
Dept: INTERNAL MEDICINE | Facility: CLINIC | Age: 31
End: 2021-09-16

## 2021-09-16 NOTE — TELEPHONE ENCOUNTER
Caller: Femi Galvez    Relationship: Self    Best call back number: [phone]    What form or medical record are you requesting: COPY OF NEGATIVE COVID TEST    Who is requesting this form or medical record from you: EMPLOYER    How would you like to receive the form or medical records (pick-up, mail, fax): FAX  If fax, what is the fax number: [phone]    Timeframe paperwork needed: AS SOON AS POSSIBLE

## 2021-09-20 ENCOUNTER — LAB (OUTPATIENT)
Dept: LAB | Facility: HOSPITAL | Age: 31
End: 2021-09-20

## 2021-09-20 DIAGNOSIS — M54.50 LOW BACK PAIN, UNSPECIFIED BACK PAIN LATERALITY, UNSPECIFIED CHRONICITY, UNSPECIFIED WHETHER SCIATICA PRESENT: ICD-10-CM

## 2021-09-20 PROCEDURE — 85652 RBC SED RATE AUTOMATED: CPT

## 2021-09-20 PROCEDURE — 86140 C-REACTIVE PROTEIN: CPT

## 2021-09-20 PROCEDURE — 36415 COLL VENOUS BLD VENIPUNCTURE: CPT

## 2021-09-20 PROCEDURE — 85027 COMPLETE CBC AUTOMATED: CPT

## 2021-09-21 LAB
CRP SERPL-MCNC: <0.3 MG/DL (ref 0–0.5)
DEPRECATED RDW RBC AUTO: 41.5 FL (ref 37–54)
ERYTHROCYTE [DISTWIDTH] IN BLOOD BY AUTOMATED COUNT: 12.7 % (ref 12.3–15.4)
ERYTHROCYTE [SEDIMENTATION RATE] IN BLOOD: 8 MM/HR (ref 0–15)
HCT VFR BLD AUTO: 46.4 % (ref 37.5–51)
HGB BLD-MCNC: 16 G/DL (ref 13–17.7)
MCH RBC QN AUTO: 30.9 PG (ref 26.6–33)
MCHC RBC AUTO-ENTMCNC: 34.5 G/DL (ref 31.5–35.7)
MCV RBC AUTO: 89.6 FL (ref 79–97)
PLATELET # BLD AUTO: 305 10*3/MM3 (ref 140–450)
PMV BLD AUTO: 9.9 FL (ref 6–12)
RBC # BLD AUTO: 5.18 10*6/MM3 (ref 4.14–5.8)
WBC # BLD AUTO: 6 10*3/MM3 (ref 3.4–10.8)

## 2022-05-23 ENCOUNTER — TELEPHONE (OUTPATIENT)
Dept: INTERNAL MEDICINE | Facility: CLINIC | Age: 32
End: 2022-05-23

## 2022-05-23 NOTE — TELEPHONE ENCOUNTER
Patient did not complete MRI Lumbar Spineordered on 05/12/2021. This order is too old to be used and has been cancelled.

## 2024-03-06 ENCOUNTER — TRANSCRIBE ORDERS (OUTPATIENT)
Dept: ADMINISTRATIVE | Facility: HOSPITAL | Age: 34
End: 2024-03-06
Payer: COMMERCIAL

## 2024-03-06 DIAGNOSIS — R11.0 NAUSEA: ICD-10-CM

## 2024-03-06 DIAGNOSIS — R10.9 ABDOMINAL PAIN, UNSPECIFIED ABDOMINAL LOCATION: Primary | ICD-10-CM

## 2024-04-01 ENCOUNTER — HOSPITAL ENCOUNTER (OUTPATIENT)
Dept: ULTRASOUND IMAGING | Facility: HOSPITAL | Age: 34
Discharge: HOME | End: 2024-04-01
Payer: COMMERCIAL

## 2024-04-01 DIAGNOSIS — R11.0 NAUSEA: ICD-10-CM

## 2024-04-01 DIAGNOSIS — R10.9 ABDOMINAL PAIN, UNSPECIFIED ABDOMINAL LOCATION: ICD-10-CM

## 2024-04-01 PROCEDURE — 76700 US EXAM ABDOM COMPLETE: CPT

## 2024-07-22 ENCOUNTER — OFFICE VISIT (OUTPATIENT)
Dept: GASTROENTEROLOGY | Facility: CLINIC | Age: 34
End: 2024-07-22
Payer: COMMERCIAL

## 2024-07-22 ENCOUNTER — LAB (OUTPATIENT)
Dept: LAB | Facility: HOSPITAL | Age: 34
End: 2024-07-22
Payer: COMMERCIAL

## 2024-07-22 VITALS
DIASTOLIC BLOOD PRESSURE: 90 MMHG | RESPIRATION RATE: 16 BRPM | OXYGEN SATURATION: 98 % | SYSTOLIC BLOOD PRESSURE: 130 MMHG | HEIGHT: 72 IN | BODY MASS INDEX: 27.09 KG/M2 | WEIGHT: 200 LBS | HEART RATE: 87 BPM

## 2024-07-22 DIAGNOSIS — R19.7 DIARRHEA, UNSPECIFIED TYPE: Primary | Chronic | ICD-10-CM

## 2024-07-22 DIAGNOSIS — K62.5 RECTAL BLEEDING: Chronic | ICD-10-CM

## 2024-07-22 DIAGNOSIS — R10.12 LEFT UPPER QUADRANT ABDOMINAL PAIN: Chronic | ICD-10-CM

## 2024-07-22 DIAGNOSIS — K59.00 CONSTIPATION, UNSPECIFIED CONSTIPATION TYPE: Chronic | ICD-10-CM

## 2024-07-22 DIAGNOSIS — R19.7 DIARRHEA, UNSPECIFIED TYPE: Chronic | ICD-10-CM

## 2024-07-22 DIAGNOSIS — K21.00 GASTROESOPHAGEAL REFLUX DISEASE WITH ESOPHAGITIS, UNSPECIFIED WHETHER HEMORRHAGE: Chronic | ICD-10-CM

## 2024-07-22 DIAGNOSIS — F10.10 ETOH ABUSE: Chronic | ICD-10-CM

## 2024-07-22 DIAGNOSIS — K92.1 MELENA: Chronic | ICD-10-CM

## 2024-07-22 LAB
ALBUMIN SERPL-MCNC: 4.6 G/DL (ref 3.5–5.2)
ALBUMIN/GLOB SERPL: 1.7 G/DL
ALP SERPL-CCNC: 88 U/L (ref 39–117)
ALT SERPL W P-5'-P-CCNC: 31 U/L (ref 1–41)
ANION GAP SERPL CALCULATED.3IONS-SCNC: 12.5 MMOL/L (ref 5–15)
AST SERPL-CCNC: 23 U/L (ref 1–40)
BILIRUB SERPL-MCNC: 1.1 MG/DL (ref 0–1.2)
BUN SERPL-MCNC: 14 MG/DL (ref 6–20)
BUN/CREAT SERPL: 13.6 (ref 7–25)
CALCIUM SPEC-SCNC: 9.8 MG/DL (ref 8.6–10.5)
CHLORIDE SERPL-SCNC: 100 MMOL/L (ref 98–107)
CO2 SERPL-SCNC: 24.5 MMOL/L (ref 22–29)
CREAT SERPL-MCNC: 1.03 MG/DL (ref 0.76–1.27)
DEPRECATED RDW RBC AUTO: 40.6 FL (ref 37–54)
EGFRCR SERPLBLD CKD-EPI 2021: 98.4 ML/MIN/1.73
ERYTHROCYTE [DISTWIDTH] IN BLOOD BY AUTOMATED COUNT: 12.4 % (ref 12.3–15.4)
GLOBULIN UR ELPH-MCNC: 2.7 GM/DL
GLUCOSE SERPL-MCNC: 90 MG/DL (ref 65–99)
HCT VFR BLD AUTO: 46.2 % (ref 37.5–51)
HGB BLD-MCNC: 15.5 G/DL (ref 13–17.7)
IGA1 MFR SER: 202 MG/DL (ref 70–400)
MCH RBC QN AUTO: 30.6 PG (ref 26.6–33)
MCHC RBC AUTO-ENTMCNC: 33.5 G/DL (ref 31.5–35.7)
MCV RBC AUTO: 91.1 FL (ref 79–97)
PLATELET # BLD AUTO: 217 10*3/MM3 (ref 140–450)
PMV BLD AUTO: 10 FL (ref 6–12)
POTASSIUM SERPL-SCNC: 4.2 MMOL/L (ref 3.5–5.2)
PROT SERPL-MCNC: 7.3 G/DL (ref 6–8.5)
RBC # BLD AUTO: 5.07 10*6/MM3 (ref 4.14–5.8)
SODIUM SERPL-SCNC: 137 MMOL/L (ref 136–145)
TSH SERPL DL<=0.05 MIU/L-ACNC: 0.71 UIU/ML (ref 0.27–4.2)
WBC NRBC COR # BLD AUTO: 4.11 10*3/MM3 (ref 3.4–10.8)

## 2024-07-22 PROCEDURE — 80050 GENERAL HEALTH PANEL: CPT

## 2024-07-22 PROCEDURE — 99204 OFFICE O/P NEW MOD 45 MIN: CPT | Performed by: NURSE PRACTITIONER

## 2024-07-22 PROCEDURE — 36415 COLL VENOUS BLD VENIPUNCTURE: CPT

## 2024-07-22 PROCEDURE — 82784 ASSAY IGA/IGD/IGG/IGM EACH: CPT

## 2024-07-22 PROCEDURE — 83013 H PYLORI (C-13) BREATH: CPT

## 2024-07-22 PROCEDURE — 86364 TISS TRNSGLTMNASE EA IG CLAS: CPT

## 2024-07-22 RX ORDER — PANTOPRAZOLE SODIUM 40 MG/1
TABLET, DELAYED RELEASE ORAL
Qty: 30 TABLET | Refills: 3 | Status: SHIPPED | OUTPATIENT
Start: 2024-07-22

## 2024-07-22 RX ORDER — SODIUM CHLORIDE 9 MG/ML
70 INJECTION, SOLUTION INTRAVENOUS CONTINUOUS PRN
OUTPATIENT
Start: 2024-07-22

## 2024-07-22 RX ORDER — BISACODYL 5 MG/1
TABLET, DELAYED RELEASE ORAL
Qty: 4 TABLET | Refills: 0 | Status: SHIPPED | OUTPATIENT
Start: 2024-07-22

## 2024-07-22 NOTE — PROGRESS NOTES
New Patient Consult      Date: 2024   Patient Name: Femi Galvez  MRN: [id number]  : 1990     Primary Care Provider: Amalia Ortega APRN    Chief Complaint   Patient presents with    Diarrhea    Constipation     History of Present Illness: Femi Galvez is a 34 y.o. male who is here today to establish care with gastroenterology for diarrhea and constipation.     He has a history of constipation alternating with diarrhea for about 3 years. He has a history of alcohol abuse and when he is drinking alcohol, he has diarrhea and when he is sober and has not had alcohol he will have constipation. He doesn't take anything on a regular basis for either. He has had rectal bleeding off and on for a few years. He has a history of left upper quadrant pain that is constant for the past couple of years. If he drinks alcohol it is worse. He has had black stools in the past, but none in a year. He has been sober now for 5 days, but was drinking 12-24 beers per day for a few years. He had drank more than that in the past.  No nausea or vomiting unless he has too much to drink. He has some reflux after eating a large meal but he is not taking anything for reflux on a regular basis. He denies any hematemesis. Denies NSAID or marijuana/THC use.     He has not had colonoscopy or EGD in the past. His uncle possibly had liver cancer, but otherwise, no family history of GI malignancy.     Subjective      Past Medical History:   Diagnosis Date    Alcoholism     Asthma     Bipolar 1 disorder     Bipolar disorder     Depression     GERD (gastroesophageal reflux disease)     Heart disease     Heart murmur     Hypertension      Past Surgical History:   Procedure Laterality Date    BACK SURGERY N/A      Family History   Problem Relation Age of Onset    Hypertension Mother     Arthritis Father     Hypertension Father     Hyperlipidemia Father     Hypertension Maternal Aunt     Cancer Maternal Aunt      Hypertension Maternal Uncle     Liver cancer Maternal Uncle         possible, but not sure    Hypertension Paternal Aunt     Diabetes Paternal Uncle     Hypertension Paternal Uncle     Hypertension Maternal Grandmother     Hypertension Maternal Grandfather     Arthritis Paternal Grandmother     Hypertension Paternal Grandmother     Arthritis Paternal Grandfather     Hypertension Paternal Grandfather     Liver disease Other     Mental illness Other     Obesity Other     Stroke Other     Colon cancer Neg Hx      Social History     Socioeconomic History    Marital status: Single   Tobacco Use    Smoking status: Some Days     Current packs/day: 0.50     Average packs/day: 0.5 packs/day for 2.0 years (1.0 ttl pk-yrs)     Types: Cigarettes    Smokeless tobacco: Never   Vaping Use    Vaping status: Some Days   Substance and Sexual Activity    Alcohol use: Yes     Alcohol/week: 30.0 standard drinks of alcohol     Types: 30 Cans of beer per week     Comment: Weekly    Drug use: No    Sexual activity: Defer       Current Outpatient Medications:     bisacodyl (DULCOLAX) 5 MG EC tablet, Take as directed for colon prep, Disp: 4 tablet, Rfl: 0    pantoprazole (PROTONIX) 40 MG EC tablet, 1 po daily in the am 30 minutes before breakfast, Disp: 30 tablet, Rfl: 3    polyethylene glycol (GoLYTELY) 236 g solution, Take 4,000 mL by mouth 1 (One) Time for 1 dose. Use as directed for colonoscopy prep., Disp: 4000 mL, Rfl: 0     Allergies   Allergen Reactions    Marijuana (Cannabis Sativa) Swelling     The following portions of the patient's history were reviewed and updated as appropriate: allergies, current medications, past family history, past medical history, past social history, past surgical history and problem list.    Objective     Physical Exam  Vitals and nursing note reviewed.   Constitutional:       General: He is not in acute distress.     Appearance: Normal appearance. He is well-developed.   HENT:      Head: Normocephalic  "and atraumatic.      Mouth/Throat:      Mouth: Mucous membranes are not pale, not dry and not cyanotic.   Eyes:      General: Lids are normal.   Neck:      Trachea: Trachea normal.   Cardiovascular:      Rate and Rhythm: Normal rate.   Pulmonary:      Effort: Pulmonary effort is normal. No respiratory distress.      Breath sounds: Normal breath sounds.   Abdominal:      General: Bowel sounds are normal.      Palpations: Abdomen is soft.      Tenderness: There is no abdominal tenderness.   Skin:     General: Skin is warm and dry.   Neurological:      Mental Status: He is alert and oriented to person, place, and time.   Psychiatric:         Mood and Affect: Mood normal.         Speech: Speech normal.         Behavior: Behavior normal. Behavior is cooperative.       Vitals:    07/22/24 0956   BP: 130/90   Pulse: 87   Resp: 16   SpO2: 98%   Weight: 90.7 kg (200 lb)   Height: 182.9 cm (72\")     Body mass index is 27.12 kg/m².     Results Review:   I have reviewed the patient's new clinical and imaging results.    No visits with results within 90 Day(s) from this visit.   Latest known visit with results is:   Lab on 09/20/2021   Component Date Value Ref Range Status    C-Reactive Protein 09/20/2021 <0.30  0.00 - 0.50 mg/dL Final    Sed Rate 09/20/2021 8  0 - 15 mm/hr Final    WBC 09/20/2021 6.00  3.40 - 10.80 10*3/mm3 Final    RBC 09/20/2021 5.18  4.14 - 5.80 10*6/mm3 Final    Hemoglobin 09/20/2021 16.0  13.0 - 17.7 g/dL Final    Hematocrit 09/20/2021 46.4  37.5 - 51.0 % Final    MCV 09/20/2021 89.6  79.0 - 97.0 fL Final    MCH 09/20/2021 30.9  26.6 - 33.0 pg Final    MCHC 09/20/2021 34.5  31.5 - 35.7 g/dL Final    RDW 09/20/2021 12.7  12.3 - 15.4 % Final    RDW-SD 09/20/2021 41.5  37.0 - 54.0 fl Final    MPV 09/20/2021 9.9  6.0 - 12.0 fL Final    Platelets 09/20/2021 305  140 - 450 10*3/mm3 Final      US Abdomen complete     4/1/2024  Normal abdominal ultrasound.     Assessment / Plan      1. Diarrhea, unspecified " type  2. Constipation, unspecified constipation type  3. Rectal bleeding  4. ETOH abuse  5. Left upper quadrant abdominal pain  6. Melena  7. Gastroesophageal reflux disease with esophagitis, unspecified whether hemorrhage  Symptoms suggestive of IBS-C at baseline, worsened with alcohol abuse. He has been sober for 5 days. He is not taking anything for his symptoms. No recent labs.  Abdominal ultrasound dated 4/1/2024 unremarkable.  Antireflux measures  Pantoprazole 40 mg daily  Zofran as needed for nausea.  Metamucil or fiber Gummies daily.  Low FODMAP diet  May use MiraLAX, stool softeners or Imodium as needed.  Labs  Stool studies  H. pylori breath test  Will schedule colonoscopy and EGD for further evaluation due to history of rectal bleeding and melena.    - CBC (No Diff); Future  - Comprehensive Metabolic Panel; Future  - TSH; Future  - IgA; Future  - Tissue Transglutaminase, IgA; Future  - Calprotectin, Fecal - Stool, Per Rectum; Future  - Pancreatic Elastase, Fecal - Stool, Per Rectum; Future  - Case Request  - bisacodyl (DULCOLAX) 5 MG EC tablet; Take as directed for colon prep  Dispense: 4 tablet; Refill: 0  - polyethylene glycol (GoLYTELY) 236 g solution; Take 4,000 mL by mouth 1 (One) Time for 1 dose. Use as directed for colonoscopy prep.  Dispense: 4000 mL; Refill: 0  - H. Pylori Breath Test - Breath, Lung; Future  - pantoprazole (PROTONIX) 40 MG EC tablet; 1 po daily in the am 30 minutes before breakfast  Dispense: 30 tablet; Refill: 3    Patient Instructions   Antireflux measures: Avoid fried, fatty foods, alcohol, chocolate, coffee, tea,  soft drinks, all carbonated beverages (including sparkling water), peppermint and spearmint, spicy foods, tomatoes and tomato based foods, onions, peppers, and garlic.   Other antireflux measures include weight reduction if overweight, avoiding tight clothing around the abdomen, elevating the head of the bed 6 inches with blocks under the head board, and don't drink  or eat before going to bed and avoid lying down immediately after meals.  Pantoprazole 40 mg 1 by mouth in the am 30 minutes before breakfast.  Zofran 4 mg 1 po every 8 hours as needed for nausea.  High fiber, low fat diet with liberal water intake.   Metamucil 1 packet/scoop daily or fiber gummies/capsules 2-4 per day.   Low FODMAP diet - avoid all dairy. May use lactose free/dairy free alternatives such as almond milk, rice milk, oat milk, etc.   Continue to avoid alcohol.  Miralax 17 grams daily as needed for constipation.   May add stool softeners 2 per day if needed for constipation.   Imodium 2 mg 1/2-1 tablet 1-2 times per day as needed for diarrhea >3 episodes.   Labs  Stool studies  H. Pylori breath test  Upper endoscopy-EGD: The indications, technique, alternatives and potential risk and complications were discussed with the patient including but not limited to bleeding, perforations, missing lesions and anesthetic complications. The patient understands and wishes to proceed with the procedure and has given their verbal consent. Written patient education information was given to the patient.   Colonoscopy: The indications, technique, alternatives and potential risk and complications were discussed with the patient including but not limited to bleeding, perforations, missing lesions and anesthetic complications. The patient understands and wishes to proceed with the procedure and has given their verbal consent. Written patient education information was given to the patient.   The patient will call if they have further questions before procedure.       Low-FODMAP Eating Plan    FODMAP stands for fermentable oligosaccharides, disaccharides, monosaccharides, and polyols. These are sugars that are hard for some people to digest. A low-FODMAP eating plan may help some people who have irritable bowel syndrome (IBS) and certain other bowel (intestinal) diseases to manage their symptoms.  This meal plan can be  complicated to follow. Work with a diet and nutrition specialist (dietitian) to make a low-FODMAP eating plan that is right for you. A dietitian can help make sure that you get enough nutrition from this diet.  What are tips for following this plan?  Reading food labels  Check labels for hidden FODMAPs such as:  High-fructose syrup.  Honey.  Agave.  Natural fruit flavors.  Onion or garlic powder.  Choose low-FODMAP foods that contain 3-4 grams of fiber per serving.  Check food labels for serving sizes. Eat only one serving at a time to make sure FODMAP levels stay low.  Shopping  Shop with a list of foods that are recommended on this diet and make a meal plan.  Meal planning  Follow a low-FODMAP eating plan for up to 6 weeks, or as told by your health care provider or dietitian.  To follow the eating plan:  Eliminate high-FODMAP foods from your diet completely. Choose only low-FODMAP foods to eat. You will do this for 2-6 weeks.  Gradually reintroduce high-FODMAP foods into your diet one at a time. Most people should wait a few days before introducing the next new high-FODMAP food into their meal plan. Your dietitian can recommend how quickly you may reintroduce foods.  Keep a daily record of what and how much you eat and drink. Make note of any symptoms that you have after eating.  Review your daily record with a dietitian regularly to identify which foods you can eat and which foods you should avoid.  General tips  Drink enough fluid each day to keep your urine pale yellow.  Avoid processed foods. These often have added sugar and may be high in FODMAPs.  Avoid most dairy products, whole grains, and sweeteners.  Work with a dietitian to make sure you get enough fiber in your diet.  Avoid high FODMAP foods at meals to manage symptoms.    Recommended foods  Fruits  Bananas, oranges, tangerines, juana, limes, blueberries, raspberries, strawberries, grapes, cantaloupe, honeydew melon, kiwi, papaya, passion fruit, and  "pineapple. Limited amounts of dried cranberries, banana chips, and shredded coconut.  Vegetables  Eggplant, zucchini, cucumber, peppers, green beans, bean sprouts, lettuce, arugula, kale, Swiss chard, spinach, giuseppe greens, bok olga, summer squash, potato, and tomato. Limited amounts of corn, carrot, and sweet potato. Green parts of scallions.  Grains  Gluten-free grains, such as rice, oats, buckwheat, quinoa, corn, polenta, and millet. Gluten-free pasta, bread, or cereal. Rice noodles. Corn tortillas.  Meats and other proteins  Unseasoned beef, pork, poultry, or fish. Eggs. Chairez. Tofu (firm) and tempeh. Limited amounts of nuts and seeds, such as almonds, walnuts, brazil nuts, pecans, peanuts, nut butters, pumpkin seeds, day seeds, and sunflower seeds.  Dairy  Lactose-free milk, yogurt, and kefir. Lactose-free cottage cheese and ice cream. Non-dairy milks, such as almond, coconut, hemp, and rice milk. Non-dairy yogurt. Limited amounts of goat cheese, brie, mozzarella, parmesan, swiss, and other hard cheeses.  Fats and oils  Butter-free spreads. Vegetable oils, such as olive, canola, and sunflower oil.  Seasoning and other foods  Artificial sweeteners with names that do not end in \"ol,\" such as aspartame, saccharine, and stevia. Maple syrup, white table sugar, raw sugar, brown sugar, and molasses. Mayonnaise, soy sauce, and tamari. Fresh basil, coriander, parsley, rosemary, and thyme.  Beverages  Water and mineral water. Sugar-sweetened soft drinks. Small amounts of orange juice or cranberry juice. Black and green tea. Most dry narda. Coffee.  The items listed above may not be a complete list of foods and beverages you can eat. Contact a dietitian for more information.    Foods to avoid  Fruits  Fresh, dried, and juiced forms of apple, pear, watermelon, peach, plum, cherries, apricots, blackberries, boysenberries, figs, nectarines, and pedrito. Avocado.  Vegetables  Chicory root, artichoke, asparagus, cabbage, snow " peas, [place] sprouts, broccoli, sugar snap peas, mushrooms, celery, and cauliflower. Onions, garlic, leeks, and the white part of scallions.  Grains  Wheat, including kamut, durum, and semolina. Barley and bulgur. Couscous. Wheat-based cereals. Wheat noodles, bread, crackers, and pastries.  Meats and other proteins  Fried or fatty meat. Sausage. Cashews and pistachios. Soybeans, baked beans, black beans, chickpeas, kidney beans, genet beans, navy beans, lentils, black-eyed peas, and split peas.  Dairy  Milk, yogurt, ice cream, and soft cheese. Cream and sour cream. Milk-based sauces. Custard. Buttermilk. Soy milk.  Seasoning and other foods  Any sugar-free gum or candy. Foods that contain artificial sweeteners such as sorbitol, mannitol, isomalt, or xylitol. Foods that contain honey, high-fructose corn syrup, or agave. Bouillon, vegetable stock, beef stock, and chicken stock. Garlic and onion powder. Condiments made with onion, such as hummus, chutney, pickles, relish, salad dressing, and salsa. Tomato paste.  Beverages  Chicory-based drinks. Coffee substitutes. Chamomile tea. Fennel tea. Sweet or fortified narda such as port or mirian. Diet soft drinks made with isomalt, mannitol, maltitol, sorbitol, or xylitol. Apple, pear, and pedrito juice. Juices with high-fructose corn syrup.  The items listed above may not be a complete list of foods and beverages you should avoid. Contact a dietitian for more information.    Summary  FODMAP stands for fermentable oligosaccharides, disaccharides, monosaccharides, and polyols. These are sugars that are hard for some people to digest.  A low-FODMAP eating plan is a short-term diet that helps to ease symptoms of certain bowel diseases.  The eating plan usually lasts up to 6 weeks. After that, high-FODMAP foods are reintroduced gradually and one at a time. This can help you find out which foods may be causing symptoms.  A low-FODMAP eating plan can be complicated. It is best to  work with a dietitian who has experience with this type of plan.  This information is not intended to replace advice given to you by your health care provider. Make sure you discuss any questions you have with your health care provider.  Document Revised: 05/06/2021 Document Reviewed: 05/06/2021  [employer] Patient Education © 2023 [employer]     Bam Danielson, APRN  7/22/2024    Please note that portions of this note may have been completed with a voice recognition program.

## 2024-07-22 NOTE — PATIENT INSTRUCTIONS
Antireflux measures: Avoid fried, fatty foods, alcohol, chocolate, coffee, tea,  soft drinks, all carbonated beverages (including sparkling water), peppermint and spearmint, spicy foods, tomatoes and tomato based foods, onions, peppers, and garlic.   Other antireflux measures include weight reduction if overweight, avoiding tight clothing around the abdomen, elevating the head of the bed 6 inches with blocks under the head board, and don't drink or eat before going to bed and avoid lying down immediately after meals.  Pantoprazole 40 mg 1 by mouth in the am 30 minutes before breakfast.  Zofran 4 mg 1 po every 8 hours as needed for nausea.  High fiber, low fat diet with liberal water intake.   Metamucil 1 packet/scoop daily or fiber gummies/capsules 2-4 per day.   Low FODMAP diet - avoid all dairy. May use lactose free/dairy free alternatives such as almond milk, rice milk, oat milk, etc.   Continue to avoid alcohol.  Miralax 17 grams daily as needed for constipation.   May add stool softeners 2 per day if needed for constipation.   Imodium 2 mg 1/2-1 tablet 1-2 times per day as needed for diarrhea >3 episodes.   Labs  Stool studies  H. Pylori breath test  Upper endoscopy-EGD: The indications, technique, alternatives and potential risk and complications were discussed with the patient including but not limited to bleeding, perforations, missing lesions and anesthetic complications. The patient understands and wishes to proceed with the procedure and has given their verbal consent. Written patient education information was given to the patient.   Colonoscopy: The indications, technique, alternatives and potential risk and complications were discussed with the patient including but not limited to bleeding, perforations, missing lesions and anesthetic complications. The patient understands and wishes to proceed with the procedure and has given their verbal consent. Written patient education information was given to the  patient.   The patient will call if they have further questions before procedure.       Low-FODMAP Eating Plan    FODMAP stands for fermentable oligosaccharides, disaccharides, monosaccharides, and polyols. These are sugars that are hard for some people to digest. A low-FODMAP eating plan may help some people who have irritable bowel syndrome (IBS) and certain other bowel (intestinal) diseases to manage their symptoms.  This meal plan can be complicated to follow. Work with a diet and nutrition specialist (dietitian) to make a low-FODMAP eating plan that is right for you. A dietitian can help make sure that you get enough nutrition from this diet.  What are tips for following this plan?  Reading food labels  Check labels for hidden FODMAPs such as:  High-fructose syrup.  Honey.  Agave.  Natural fruit flavors.  Onion or garlic powder.  Choose low-FODMAP foods that contain 3-4 grams of fiber per serving.  Check food labels for serving sizes. Eat only one serving at a time to make sure FODMAP levels stay low.  Shopping  Shop with a list of foods that are recommended on this diet and make a meal plan.  Meal planning  Follow a low-FODMAP eating plan for up to 6 weeks, or as told by your health care provider or dietitian.  To follow the eating plan:  Eliminate high-FODMAP foods from your diet completely. Choose only low-FODMAP foods to eat. You will do this for 2-6 weeks.  Gradually reintroduce high-FODMAP foods into your diet one at a time. Most people should wait a few days before introducing the next new high-FODMAP food into their meal plan. Your dietitian can recommend how quickly you may reintroduce foods.  Keep a daily record of what and how much you eat and drink. Make note of any symptoms that you have after eating.  Review your daily record with a dietitian regularly to identify which foods you can eat and which foods you should avoid.  General tips  Drink enough fluid each day to keep your urine pale  "yellow.  Avoid processed foods. These often have added sugar and may be high in FODMAPs.  Avoid most dairy products, whole grains, and sweeteners.  Work with a dietitian to make sure you get enough fiber in your diet.  Avoid high FODMAP foods at meals to manage symptoms.    Recommended foods  Fruits  Bananas, oranges, tangerines, juana, limes, blueberries, raspberries, strawberries, grapes, cantaloupe, honeydew melon, kiwi, papaya, passion fruit, and pineapple. Limited amounts of dried cranberries, banana chips, and shredded coconut.  Vegetables  Eggplant, zucchini, cucumber, peppers, green beans, bean sprouts, lettuce, arugula, kale, Swiss chard, spinach, giuseppe greens, bok olga, summer squash, potato, and tomato. Limited amounts of corn, carrot, and sweet potato. Green parts of scallions.  Grains  Gluten-free grains, such as rice, oats, buckwheat, quinoa, corn, polenta, and millet. Gluten-free pasta, bread, or cereal. Rice noodles. Corn tortillas.  Meats and other proteins  Unseasoned beef, pork, poultry, or fish. Eggs. Chairez. Tofu (firm) and tempeh. Limited amounts of nuts and seeds, such as almonds, walnuts, brazil nuts, pecans, peanuts, nut butters, pumpkin seeds, day seeds, and sunflower seeds.  Dairy  Lactose-free milk, yogurt, and kefir. Lactose-free cottage cheese and ice cream. Non-dairy milks, such as almond, coconut, hemp, and rice milk. Non-dairy yogurt. Limited amounts of goat cheese, brie, mozzarella, parmesan, swiss, and other hard cheeses.  Fats and oils  Butter-free spreads. Vegetable oils, such as olive, canola, and sunflower oil.  Seasoning and other foods  Artificial sweeteners with names that do not end in \"ol,\" such as aspartame, saccharine, and stevia. Maple syrup, white table sugar, raw sugar, brown sugar, and molasses. Mayonnaise, soy sauce, and tamari. Fresh basil, coriander, parsley, rosemary, and thyme.  Beverages  Water and mineral water. Sugar-sweetened soft drinks. Small amounts of " orange juice or cranberry juice. Black and green tea. Most dry narda. Coffee.  The items listed above may not be a complete list of foods and beverages you can eat. Contact a dietitian for more information.    Foods to avoid  Fruits  Fresh, dried, and juiced forms of apple, pear, watermelon, peach, plum, cherries, apricots, blackberries, boysenberries, figs, nectarines, and pedrito. Avocado.  Vegetables  Chicory root, artichoke, asparagus, cabbage, snow peas, [place] sprouts, broccoli, sugar snap peas, mushrooms, celery, and cauliflower. Onions, garlic, leeks, and the white part of scallions.  Grains  Wheat, including kamut, durum, and semolina. Barley and bulgur. Couscous. Wheat-based cereals. Wheat noodles, bread, crackers, and pastries.  Meats and other proteins  Fried or fatty meat. Sausage. Cashews and pistachios. Soybeans, baked beans, black beans, chickpeas, kidney beans, genet beans, navy beans, lentils, black-eyed peas, and split peas.  Dairy  Milk, yogurt, ice cream, and soft cheese. Cream and sour cream. Milk-based sauces. Custard. Buttermilk. Soy milk.  Seasoning and other foods  Any sugar-free gum or candy. Foods that contain artificial sweeteners such as sorbitol, mannitol, isomalt, or xylitol. Foods that contain honey, high-fructose corn syrup, or agave. Bouillon, vegetable stock, beef stock, and chicken stock. Garlic and onion powder. Condiments made with onion, such as hummus, chutney, pickles, relish, salad dressing, and salsa. Tomato paste.  Beverages  Chicory-based drinks. Coffee substitutes. Chamomile tea. Fennel tea. Sweet or fortified narda such as port or mirian. Diet soft drinks made with isomalt, mannitol, maltitol, sorbitol, or xylitol. Apple, pear, and pedrito juice. Juices with high-fructose corn syrup.  The items listed above may not be a complete list of foods and beverages you should avoid. Contact a dietitian for more information.    Summary  FODMAP stands for fermentable  oligosaccharides, disaccharides, monosaccharides, and polyols. These are sugars that are hard for some people to digest.  A low-FODMAP eating plan is a short-term diet that helps to ease symptoms of certain bowel diseases.  The eating plan usually lasts up to 6 weeks. After that, high-FODMAP foods are reintroduced gradually and one at a time. This can help you find out which foods may be causing symptoms.  A low-FODMAP eating plan can be complicated. It is best to work with a dietitian who has experience with this type of plan.  This information is not intended to replace advice given to you by your health care provider. Make sure you discuss any questions you have with your health care provider.  Document Revised: 05/06/2021 Document Reviewed: 05/06/2021  Elsevier Patient Education © 2023 [employer]

## 2024-07-23 LAB
TTG IGA SER-ACNC: <2 U/ML (ref 0–3)
UREA BREATH TEST QL: NEGATIVE

## 2024-07-25 ENCOUNTER — PATIENT ROUNDING (BHMG ONLY) (OUTPATIENT)
Dept: GASTROENTEROLOGY | Facility: CLINIC | Age: 34
End: 2024-07-25
Payer: COMMERCIAL

## 2024-07-25 NOTE — PROGRESS NOTES
July 25, 2024    Hello, may I speak with Femi Galvez?    My name is Carmen Couch GREY    I am  with [facility]  [address]  [place] [address] [phone].    Before we get started may I verify your date of birth? 1990    I am calling to officially welcome you to our practice and ask about your recent visit. Is this a good time to talk? no    Tell me about your visit with us. What things went well?  Left message       We're always looking for ways to make our patients' experiences even better. Do you have recommendations on ways we may improve?  no    Overall were you satisfied with your first visit to our practice? yes       I appreciate you taking the time to speak with me today. Is there anything else I can do for you? no      Thank you, and have a great day.

## 2024-07-29 PROCEDURE — 83993 ASSAY FOR CALPROTECTIN FECAL: CPT

## 2024-07-29 PROCEDURE — 82653 EL-1 FECAL QUANTITATIVE: CPT

## 2024-08-01 LAB — ELASTASE PANC STL-MCNT: 583 UG ELAST./G

## 2024-08-02 LAB — CALPROTECTIN STL-MCNT: 15 UG/G (ref 0–120)

## 2024-08-20 ENCOUNTER — HOSPITAL ENCOUNTER (OUTPATIENT)
Facility: HOSPITAL | Age: 34
Setting detail: HOSPITAL OUTPATIENT SURGERY
Discharge: HOME | End: 2024-08-20
Attending: INTERNAL MEDICINE | Admitting: INTERNAL MEDICINE
Payer: COMMERCIAL

## 2024-08-20 ENCOUNTER — ANESTHESIA EVENT (OUTPATIENT)
Dept: GASTROENTEROLOGY | Facility: HOSPITAL | Age: 34
End: 2024-08-20
Payer: COMMERCIAL

## 2024-08-20 ENCOUNTER — ANESTHESIA (OUTPATIENT)
Dept: GASTROENTEROLOGY | Facility: HOSPITAL | Age: 34
End: 2024-08-20
Payer: COMMERCIAL

## 2024-08-20 VITALS
DIASTOLIC BLOOD PRESSURE: 56 MMHG | RESPIRATION RATE: 16 BRPM | TEMPERATURE: 97.7 F | OXYGEN SATURATION: 97 % | HEART RATE: 63 BPM | SYSTOLIC BLOOD PRESSURE: 100 MMHG

## 2024-08-20 DIAGNOSIS — K92.1 MELENA: ICD-10-CM

## 2024-08-20 DIAGNOSIS — R10.12 LEFT UPPER QUADRANT ABDOMINAL PAIN: ICD-10-CM

## 2024-08-20 DIAGNOSIS — K62.5 RECTAL BLEEDING: ICD-10-CM

## 2024-08-20 PROCEDURE — 25810000003 SODIUM CHLORIDE 0.9 % SOLUTION: Performed by: NURSE PRACTITIONER

## 2024-08-20 PROCEDURE — 25010000002 GLYCOPYRROLATE PF 0.4 MG/2ML SOLUTION: Performed by: NURSE ANESTHETIST, CERTIFIED REGISTERED

## 2024-08-20 PROCEDURE — 25810000003 SODIUM CHLORIDE 0.9 % SOLUTION: Performed by: NURSE ANESTHETIST, CERTIFIED REGISTERED

## 2024-08-20 PROCEDURE — 25010000002 PROPOFOL 10 MG/ML EMULSION: Performed by: NURSE ANESTHETIST, CERTIFIED REGISTERED

## 2024-08-20 RX ORDER — SODIUM CHLORIDE 9 MG/ML
70 INJECTION, SOLUTION INTRAVENOUS CONTINUOUS PRN
Status: DISCONTINUED | OUTPATIENT
Start: 2024-08-20 | End: 2024-08-20 | Stop reason: HOSPADM

## 2024-08-20 RX ORDER — SODIUM CHLORIDE 9 MG/ML
INJECTION, SOLUTION INTRAVENOUS CONTINUOUS PRN
Status: DISCONTINUED | OUTPATIENT
Start: 2024-08-20 | End: 2024-08-20 | Stop reason: SURG

## 2024-08-20 RX ORDER — PROPOFOL 10 MG/ML
VIAL (ML) INTRAVENOUS CONTINUOUS PRN
Status: DISCONTINUED | OUTPATIENT
Start: 2024-08-20 | End: 2024-08-20 | Stop reason: SURG

## 2024-08-20 RX ADMIN — SODIUM CHLORIDE: 9 INJECTION, SOLUTION INTRAVENOUS at 11:26

## 2024-08-20 RX ADMIN — SODIUM CHLORIDE 70 ML/HR: 9 INJECTION, SOLUTION INTRAVENOUS at 09:53

## 2024-08-20 RX ADMIN — GLYCOPYRROLATE 0.2 MCG: 0.2 INJECTION, SOLUTION INTRAMUSCULAR; INTRAVENOUS at 11:34

## 2024-08-20 RX ADMIN — Medication 50 MG: at 11:34

## 2024-08-20 RX ADMIN — PROPOFOL 250 MCG/KG/MIN: 10 INJECTION, EMULSION INTRAVENOUS at 11:34

## 2024-08-20 RX ADMIN — LIDOCAINE HYDROCHLORIDE 60 MG: 20 INJECTION, SOLUTION INTRAVENOUS at 11:34

## 2024-08-20 NOTE — ANESTHESIA POSTPROCEDURE EVALUATION
Patient: Femi Galvez    Procedure Summary       Date: 08/20/24 Room / Location: [facility] ENDOSCOPY 2 / [facility] ENDOSCOPY    Anesthesia Start: 1126 Anesthesia Stop: 1200    Procedures:       ESOPHAGOGASTRODUODENOSCOPY WITH BIOPSY (Esophagus)      COLONOSCOPY WITH BIOPSY (Anus) Diagnosis:       Rectal bleeding      Left upper quadrant abdominal pain      Melena      (Rectal bleeding [K62.5])      (Left upper quadrant abdominal pain [R10.12])      (Melena [K92.1])    Surgeons: Riana Robison MD Provider: Jose Thomson CRNA    Anesthesia Type: MAC ASA Status: 2            Anesthesia Type: MAC    Vitals  No vitals data found for the desired time range.          Post Anesthesia Care and Evaluation    Patient location during evaluation: PHASE II  Patient participation: complete - patient participated  Level of consciousness: awake and alert  Pain score: 0  Pain management: satisfactory to patient    Airway patency: patent  Anesthetic complications: No anesthetic complications  PONV Status: none  Cardiovascular status: acceptable and stable  Respiratory status: acceptable and spontaneous ventilation  Hydration status: acceptable    Comments: Vitals signs as noted in nursing documentation as per protocol.

## 2024-08-20 NOTE — ANESTHESIA PREPROCEDURE EVALUATION
Anesthesia Evaluation     Patient summary reviewed and Nursing notes reviewed   NPO Solid Status: > 8 hours  NPO Liquid Status: > 2 hours           Airway   Mallampati: II  TM distance: >3 FB  Neck ROM: full  No difficulty expected  Dental - normal exam     Pulmonary     breath sounds clear to auscultation  (+) a smoker Current, asthma,  Cardiovascular     Rhythm: regular  Rate: normal    (+) hypertension, valvular problems/murmurs murmur      Neuro/Psych  (+) psychiatric history Anxiety, Depression and Bipolar  GI/Hepatic/Renal/Endo    (+) GERD    Musculoskeletal (-) negative ROS    Abdominal    Substance History   (+) alcohol use     OB/GYN negative ob/gyn ROS         Other                          Anesthesia Plan    ASA 2     MAC     intravenous induction     Anesthetic plan, risks, benefits, and alternatives have been provided, discussed and informed consent has been obtained with: patient.  Pre-procedure education provided  Plan discussed with CRNA.        CODE STATUS:

## 2024-08-20 NOTE — DISCHARGE INSTRUCTIONS
- Discharge patient to home (ambulatory).   - Resume previous diet.   - Follow an antireflux regimen.   - PPi daily x 8 weeks    - Lifestyle and diet changes  - Smoking cessation   - Cut down ETOH   - Await pathology results.   - Return to my office in 8 weeks.  Rest today  No pushing,pulling,tugging,heavy lifting, or strenuous activity   No major decision making,driving,or drinking alcoholic beverages for 24 hours due to the sedation you received  Always use good hand hygiene/washing technique  No driving on pain medication.    To assist you in voiding:  Drink plenty of fluids  Listen to running water while attempting to void.    If you are unable to urinate and you have an uncomfortable urge to void or it has been   6 hours since you were discharged, return to the Emergency Room

## 2024-08-20 NOTE — H&P
[facility]  HISTORY AND PHYSICAL    Patient Name: Femi Galvez  : 1990  MRN: [id number]    Chief Complaint:   For EGD/ colonoscopy    History Of Presenting Illness:    Nausea   GERD  Melena   Abdominal pain   Change in bowel habit      Past Medical History:   Diagnosis Date    Alcoholism     Asthma     Bipolar 1 disorder     Bipolar disorder     Depression     GERD (gastroesophageal reflux disease)     Heart disease     Heart murmur     Hypertension        Past Surgical History:   Procedure Laterality Date    BACK SURGERY N/A        Social History     Socioeconomic History    Marital status: Single   Tobacco Use    Smoking status: Some Days     Current packs/day: 0.50     Average packs/day: 0.5 packs/day for 2.0 years (1.0 ttl pk-yrs)     Types: Cigarettes    Smokeless tobacco: Never   Vaping Use    Vaping status: Some Days   Substance and Sexual Activity    Alcohol use: Yes     Alcohol/week: 30.0 standard drinks of alcohol     Types: 30 Cans of beer per week     Comment: Weekly    Drug use: No    Sexual activity: Defer       Family History   Problem Relation Age of Onset    Hypertension Mother     Arthritis Father     Hypertension Father     Hyperlipidemia Father     Hypertension Maternal Aunt     Cancer Maternal Aunt     Hypertension Maternal Uncle     Liver cancer Maternal Uncle         possible, but not sure    Hypertension Paternal Aunt     Diabetes Paternal Uncle     Hypertension Paternal Uncle     Hypertension Maternal Grandmother     Hypertension Maternal Grandfather     Arthritis Paternal Grandmother     Hypertension Paternal Grandmother     Arthritis Paternal Grandfather     Hypertension Paternal Grandfather     Liver disease Other     Mental illness Other     Obesity Other     Stroke Other     Colon cancer Neg Hx        Prior to Admission Medications:  Medications Prior to Admission   Medication Sig Dispense Refill Last Dose    bisacodyl (DULCOLAX) 5 MG EC tablet Take  as directed for colon prep 4 tablet 0 8/19/2024 at 1500    pantoprazole (PROTONIX) 40 MG EC tablet 1 po daily in the am 30 minutes before breakfast 30 tablet 3 8/19/2024 at 0700       Allergies:  Allergies   Allergen Reactions    Marijuana (Cannabis Sativa) Swelling        Vitals: Temp:  [97.6 °F (36.4 °C)] 97.6 °F (36.4 °C)  Heart Rate:  [61] 61  Resp:  [18] 18  BP: (123)/(88) 123/88    Review Of Systems:  Constitutional:  Negative for chills, fever, and unexpected weight change.  Respiratory:  Negative for cough, chest tightness, shortness of breath, and wheezing.  Cardiovascular:  Negative for chest pain, palpitations, and leg swelling.  Gastrointestinal:  Negative for abdominal distention, abdominal pain, nausea, vomiting.  Neurological:  Negative for weakness, numbness, and headaches.     Physical Exam:    General Appearance:  Alert, cooperative, in no acute distress.   Lungs:   Clear to auscultation, respirations regular, even and                 unlabored.   Heart:  Regular rhythm and normal rate.   Abdomen:   Normal bowel sounds, no masses, no organomegaly. Soft, nontender, nondistended   Neurologic: Alert and oriented x 3. Moves all four limbs equally       Assessment & Plan     Assessment:  Active Problems:    Rectal bleeding    Left upper quadrant abdominal pain    Melena      Plan: ESOPHAGOGASTRODUODENOSCOPY (N/A), COLONOSCOPY (N/A)     Riana Robison MD  8/20/2024

## 2024-08-21 LAB — REF LAB TEST METHOD: NORMAL

## 2024-11-19 ENCOUNTER — OFFICE VISIT (OUTPATIENT)
Dept: GASTROENTEROLOGY | Facility: CLINIC | Age: 34
End: 2024-11-19
Payer: COMMERCIAL

## 2024-11-19 VITALS
HEART RATE: 68 BPM | SYSTOLIC BLOOD PRESSURE: 122 MMHG | WEIGHT: 206 LBS | DIASTOLIC BLOOD PRESSURE: 90 MMHG | OXYGEN SATURATION: 98 % | BODY MASS INDEX: 27.94 KG/M2

## 2024-11-19 DIAGNOSIS — K58.2 IRRITABLE BOWEL SYNDROME WITH BOTH CONSTIPATION AND DIARRHEA: Chronic | ICD-10-CM

## 2024-11-19 DIAGNOSIS — K21.00 GASTROESOPHAGEAL REFLUX DISEASE WITH ESOPHAGITIS WITHOUT HEMORRHAGE: Chronic | ICD-10-CM

## 2024-11-19 DIAGNOSIS — K31.89 EROSIVE GASTROPATHY: Chronic | ICD-10-CM

## 2024-11-19 DIAGNOSIS — K59.00 CONSTIPATION, UNSPECIFIED CONSTIPATION TYPE: Chronic | ICD-10-CM

## 2024-11-19 DIAGNOSIS — R10.12 LEFT UPPER QUADRANT ABDOMINAL PAIN: Chronic | ICD-10-CM

## 2024-11-19 DIAGNOSIS — R19.7 DIARRHEA, UNSPECIFIED TYPE: Primary | Chronic | ICD-10-CM

## 2024-11-19 DIAGNOSIS — F10.10 ETOH ABUSE: Chronic | ICD-10-CM

## 2024-11-19 DIAGNOSIS — K21.00 GASTROESOPHAGEAL REFLUX DISEASE WITH ESOPHAGITIS, UNSPECIFIED WHETHER HEMORRHAGE: Chronic | ICD-10-CM

## 2024-11-19 DIAGNOSIS — K62.5 RECTAL BLEEDING: Chronic | ICD-10-CM

## 2024-11-19 PROCEDURE — 99214 OFFICE O/P EST MOD 30 MIN: CPT | Performed by: NURSE PRACTITIONER

## 2024-11-19 RX ORDER — PANTOPRAZOLE SODIUM 40 MG/1
TABLET, DELAYED RELEASE ORAL
Qty: 90 TABLET | Refills: 1 | Status: SHIPPED | OUTPATIENT
Start: 2024-11-19

## 2024-11-19 NOTE — PROGRESS NOTES
Follow Up Note     Date: 2024   Patient Name: Femi Galvez  MRN: [id number]  : 1990     Primary Care Provider: Amalia Ortega APRN     Chief Complaint   Patient presents with    Follow-up     After scopes     2024  History of Present Illness  The patient is a 34-year-old male here for a follow-up after scopes to discuss results.    He reports feeling better than his previous visit but continues to experience unusual pain in his left side. He describes a sensation of pressure and swelling in his abdomen that is worse after drinking alcohol. He admits to heavy alcohol consumption in the past. He has abstained from alcohol for the past 5 weeks, during which he noticed an improvement in his bowel movements, but still has some constipation and the left side pain.    He was prescribed pantoprazole, which he found beneficial, but misplaced it 3 weeks ago and is not taking it. Despite this, he did not notice any change in his left side pain. He admits to inconsistent use of the medication. He may take it for a day or two, then forget it for a day or two before remembering to take it again.     Interval History:  2024  He has a history of constipation alternating with diarrhea for about 3 years. He has a history of alcohol abuse and when he is drinking alcohol, he has diarrhea and when he is sober and has not had alcohol he will have constipation. He doesn't take anything on a regular basis for either. He has had rectal bleeding off and on for a few years. He has a history of left upper quadrant pain that is constant for the past couple of years. If he drinks alcohol it is worse. He has had black stools in the past, but none in a year. He has been sober now for 5 days, but was drinking 12-24 beers per day for a few years. He had drank more than that in the past.  No nausea or vomiting unless he has too much to drink. He has some reflux after eating a large meal but he is not taking  anything for reflux on a regular basis. He denies any hematemesis. Denies NSAID or marijuana/THC use.      He has not had colonoscopy or EGD in the past. His uncle possibly had liver cancer, but otherwise, no family history of GI malignancy.     Subjective      Past Medical History:   Diagnosis Date    Alcoholism     Asthma     Bipolar 1 disorder     Bipolar disorder     Depression     GERD (gastroesophageal reflux disease)     Heart disease     Heart murmur     Hypertension      Past Surgical History:   Procedure Laterality Date    BACK SURGERY N/A 2022    COLONOSCOPY N/A 8/20/2024    Procedure: COLONOSCOPY WITH BIOPSY;  Surgeon: Riana Robison MD;  Location: [facility] ENDOSCOPY;  Service: Gastroenterology;  Laterality: N/A;    ENDOSCOPY N/A 8/20/2024    Procedure: ESOPHAGOGASTRODUODENOSCOPY WITH BIOPSY;  Surgeon: Riana Robison MD;  Location: [facility] ENDOSCOPY;  Service: Gastroenterology;  Laterality: N/A;     Family History   Problem Relation Age of Onset    Hypertension Mother     Arthritis Father     Hypertension Father     Hyperlipidemia Father     Hypertension Maternal Aunt     Cancer Maternal Aunt     Hypertension Maternal Uncle     Liver cancer Maternal Uncle         possible, but not sure    Hypertension Paternal Aunt     Diabetes Paternal Uncle     Hypertension Paternal Uncle     Hypertension Maternal Grandmother     Hypertension Maternal Grandfather     Arthritis Paternal Grandmother     Hypertension Paternal Grandmother     Arthritis Paternal Grandfather     Hypertension Paternal Grandfather     Liver disease Other     Mental illness Other     Obesity Other     Stroke Other     Colon cancer Neg Hx      Social History     Socioeconomic History    Marital status: Single   Tobacco Use    Smoking status: Some Days     Current packs/day: 0.50     Average packs/day: 0.5 packs/day for 2.0 years (1.0 ttl pk-yrs)     Types: Cigarettes    Smokeless tobacco: Never   Vaping Use    Vaping status: Some  Days   Substance and Sexual Activity    Alcohol use: Yes     Alcohol/week: 30.0 standard drinks of alcohol     Types: 30 Cans of beer per week     Comment: Weekly    Drug use: No    Sexual activity: Defer       Current Outpatient Medications:     pantoprazole (PROTONIX) 40 MG EC tablet, 1 po daily in the am 30 minutes before breakfast, Disp: 90 tablet, Rfl: 1    Allergies   Allergen Reactions    Marijuana (Cannabis Sativa) Swelling     The following portions of the patient's history were reviewed and updated as appropriate: allergies, current medications, past family history, past medical history, past social history, past surgical history and problem list.  Objective     Physical Exam  Vitals and nursing note reviewed.   Constitutional:       General: He is not in acute distress.     Appearance: Normal appearance. He is well-developed.   HENT:      Head: Normocephalic and atraumatic.      Mouth/Throat:      Mouth: Mucous membranes are not pale, not dry and not cyanotic.   Eyes:      General: Lids are normal.   Neck:      Trachea: Trachea normal.   Cardiovascular:      Rate and Rhythm: Normal rate.   Pulmonary:      Effort: Pulmonary effort is normal. No respiratory distress.      Breath sounds: Normal breath sounds.   Abdominal:      Tenderness: There is no abdominal tenderness.   Skin:     General: Skin is warm and dry.   Neurological:      Mental Status: He is alert and oriented to person, place, and time.   Psychiatric:         Mood and Affect: Mood normal.         Speech: Speech normal.         Behavior: Behavior normal. Behavior is cooperative.       Vitals:    11/19/24 1613   BP: 122/90   Pulse: 68   SpO2: 98%   Weight: 93.4 kg (206 lb)     Body mass index is 27.94 kg/m².     Results Review:   I reviewed the patient's new clinical results.    No visits with results within 90 Day(s) from this visit.   Latest known visit with results is:   Admission on 08/20/2024, Discharged on 08/20/2024   Component Date Value  Ref Range Status    Reference Lab Report 2024    Final                    Value:[facility]  [address] [place] [address]  Phone: [phone] or [phone]  Fax: [phone]  Cholo Pablo M.D., Medical Director    PATIENT NAME                                     LABORATORY NO.  GAURAV WELDON.                           [id number]  [id number]                                 AGE                    SEX   SSN              CLIENT REF #  [place] [id number]        1990           [id number]      [id number]    [address]                        REQUESTING MCRISTIANA           ATTENDING MCRISTIANA         COPY TO.  [address]                                [place] [id number]                         SARAHRICK AIKENLY  DATE COLLECTED            DATE RECEIVED          DATE REPORTED  2024    DIAGNOSIS:  A.     DUODENUM, BIOPSY:  Duodenal type mucosa with no significant                           histopathologic abnormalities  B.     ANTRUM AND BODY, BIOPSY:  Gastric mucosa with mild chronic inactive gastritis  Negative for intestinal metaplasia or dysplasia  No Helicobacter pylori-like organisms seen  C.     TERMINAL ILEUM BIOPSY:  Small intestinal mucosa with no significant histopathologic abnormalities  D.     RANDOM COLON BIOPSY:  Colonic type mucosa with no significant histopathologic abnormalities    OLLIE     REVIEWED, DIAGNOSED AND ELECTRONICALLY  SIGNED BY:    Jeffrey Garcia M.D., F.C.A.P.  CPT CODES:  88305x4        Comprehensive Metabolic Panel (2024 11:16)  CBC (No Diff) (2024 11:16)  TSH (2024 11:16)  IgA (2024 11:16)  Tissue Transglutaminase, IgA (2024 11:16)  H. Pylori Breath Test - Breath, Lung (2024 11:16)  Calprotectin, Fecal - Stool, Per Rectum (2024 16:00)  Pancreatic  Elastase, Fecal - Stool, Per Rectum (07/29/2024 16:00)    US Abdomen complete     4/1/2024  Normal abdominal ultrasound.     EGD dated 8/20/2024 per Dr. Robison  - Normal oropharynx.  - Z-line irregular, 39 cm from the incisors.  - LA Grade A reflux esophagitis with no bleeding.  - Mild Erythematous mucosa in the posterior wall of the stomach and antrum.  - Mild Erosive gastropathy with no stigmata of recent bleeding. Biopsied.  - Normal duodenal bulb, first portion of the duodenum, second portion of the duodenum and third portion of the duodenum. Biopsied.  A.     DUODENUM, BIOPSY:  Duodenal type mucosa with no significant histopathologic abnormalities  B.     ANTRUM AND BODY, BIOPSY:  Gastric mucosa with mild chronic inactive gastritis  Negative for intestinal metaplasia or dysplasia  No Helicobacter pylori-like organisms seen     Colonoscopy dated 8/20/2024 per Dr. Robison  - Preparation of the colon was fair.  - Decreased sphincter tone found on digital rectal exam.  - The rectum, sigmoid colon, descending colon, splenic flexure, transverse colon, hepatic flexure, ascending colon, cecum, recto-sigmoid colon, ileocecal valve and appendiceal orifice are normal. Biopsied.  - The examined portion of the ileum was normal. Biopsied for change in bowel habit.  - Non-bleeding external and internal hemorrhoids.  - No endoscopic evidence of colitis or ileitis  C.     TERMINAL ILEUM BIOPSY:   Small intestinal mucosa with no significant histopathologic abnormalities   D.     RANDOM COLON BIOPSY:   Colonic type mucosa with no significant histopathologic abnormalities     Assessment / Plan      1. Diarrhea, unspecified type  2. Constipation, unspecified constipation type  3. Rectal bleeding  4. Irritable bowel syndrome with both constipation and diarrhea  5. Left upper quadrant abdominal pain  6. Erosive gastropathy  7. Gastroesophageal reflux disease with esophagitis without hemorrhage  8. ETOH abuse  9. Gastroesophageal  reflux disease with esophagitis, unspecified whether hemorrhage  Symptoms are doing better but continues to have left upper quadrant pain.  Alcohol seems to make symptoms worse.  He has cut back.  Basic labs unremarkable.  TSH normal.  Celiac panel negative.  H. pylori breath test negative.  Fecal calprotectin normal.  Pancreatic elastase normal.  Abdominal ultrasound dated 4/1/2024 unremarkable.  EGD dated 8/20/2024 with LA grade a reflux esophagitis and mild erosive gastropathy with no stigmata of recent bleeding.  Biopsies unremarkable, no H. pylori.  Colonoscopy dated 8/20/2024 with internal/external hemorrhoids, no endoscopic evidence of colitis or ileitis.  Terminal ileum and random biopsies unremarkable.  Likely IBS-C, symptoms worsened with alcohol use.    Antireflux measures  Pantoprazole 40 mg daily  Zofran as needed for nausea.  Metamucil or fiber Gummies daily.  Low FODMAP diet  May use MiraLAX, stool softeners or Imodium as needed.  Smoking cessation.  Continue to avoid alcohol.  CTAP to rule out solid organ pathology    - CT Abdomen Pelvis With Contrast; Future  - pantoprazole (PROTONIX) 40 MG EC tablet; 1 po daily in the am 30 minutes before breakfast  Dispense: 90 tablet; Refill: 1    Patient Instructions   Antireflux measures: Avoid fried, fatty foods, alcohol, chocolate, coffee, tea,  soft drinks, all carbonated beverages (including sparkling water), peppermint and spearmint, spicy foods, tomatoes and tomato based foods, onions, peppers, and garlic.   Other antireflux measures include weight reduction if overweight, avoiding tight clothing around the abdomen, elevating the head of the bed 6 inches with blocks under the head board, and don't drink or eat before going to bed and avoid lying down immediately after meals.  Pantoprazole 40 mg 1 by mouth in the am 30 minutes before breakfast.  Zofran 4 mg 1 po every 8 hours as needed for nausea.  High fiber, low fat diet with liberal water intake.    Metamucil 1 packet/scoop daily or fiber gummies/capsules 2-4 per day.   Low FODMAP diet - avoid all dairy. May use lactose free/dairy free alternatives such as almond milk, rice milk, oat milk, etc.   Continue to avoid alcohol.  Miralax 17 grams daily as needed for constipation.   May add stool softeners 2 per day if needed for constipation.   Imodium 2 mg 1/2-1 tablet 1-2 times per day as needed for diarrhea >3 episodes.   CT Abdomen and pelvis  Follow up: 6 months        Low-FODMAP Eating Plan    FODMAP stands for fermentable oligosaccharides, disaccharides, monosaccharides, and polyols. These are sugars that are hard for some people to digest. A low-FODMAP eating plan may help some people who have irritable bowel syndrome (IBS) and certain other bowel (intestinal) diseases to manage their symptoms.  This meal plan can be complicated to follow. Work with a diet and nutrition specialist (dietitian) to make a low-FODMAP eating plan that is right for you. A dietitian can help make sure that you get enough nutrition from this diet.  What are tips for following this plan?  Reading food labels  Check labels for hidden FODMAPs such as:  High-fructose syrup.  Honey.  Agave.  Natural fruit flavors.  Onion or garlic powder.  Choose low-FODMAP foods that contain 3-4 grams of fiber per serving.  Check food labels for serving sizes. Eat only one serving at a time to make sure FODMAP levels stay low.  Shopping  Shop with a list of foods that are recommended on this diet and make a meal plan.  Meal planning  Follow a low-FODMAP eating plan for up to 6 weeks, or as told by your health care provider or dietitian.  To follow the eating plan:  Eliminate high-FODMAP foods from your diet completely. Choose only low-FODMAP foods to eat. You will do this for 2-6 weeks.  Gradually reintroduce high-FODMAP foods into your diet one at a time. Most people should wait a few days before introducing the next new high-FODMAP food into their  meal plan. Your dietitian can recommend how quickly you may reintroduce foods.  Keep a daily record of what and how much you eat and drink. Make note of any symptoms that you have after eating.  Review your daily record with a dietitian regularly to identify which foods you can eat and which foods you should avoid.  General tips  Drink enough fluid each day to keep your urine pale yellow.  Avoid processed foods. These often have added sugar and may be high in FODMAPs.  Avoid most dairy products, whole grains, and sweeteners.  Work with a dietitian to make sure you get enough fiber in your diet.  Avoid high FODMAP foods at meals to manage symptoms.     Recommended foods  Fruits  Bananas, oranges, tangerines, juana, limes, blueberries, raspberries, strawberries, grapes, cantaloupe, honeydew melon, kiwi, papaya, passion fruit, and pineapple. Limited amounts of dried cranberries, banana chips, and shredded coconut.  Vegetables  Eggplant, zucchini, cucumber, peppers, green beans, bean sprouts, lettuce, arugula, kale, Swiss chard, spinach, giuseppe greens, bok olga, summer squash, potato, and tomato. Limited amounts of corn, carrot, and sweet potato. Green parts of scallions.  Grains  Gluten-free grains, such as rice, oats, buckwheat, quinoa, corn, polenta, and millet. Gluten-free pasta, bread, or cereal. Rice noodles. Corn tortillas.  Meats and other proteins  Unseasoned beef, pork, poultry, or fish. Eggs. Chairez. Tofu (firm) and tempeh. Limited amounts of nuts and seeds, such as almonds, walnuts, brazil nuts, pecans, peanuts, nut butters, pumpkin seeds, day seeds, and sunflower seeds.  Dairy  Lactose-free milk, yogurt, and kefir. Lactose-free cottage cheese and ice cream. Non-dairy milks, such as almond, coconut, hemp, and rice milk. Non-dairy yogurt. Limited amounts of goat cheese, brie, mozzarella, parmesan, swiss, and other hard cheeses.  Fats and oils  Butter-free spreads. Vegetable oils, such as olive, canola, and  "sunflower oil.  Seasoning and other foods  Artificial sweeteners with names that do not end in \"ol,\" such as aspartame, saccharine, and stevia. Maple syrup, white table sugar, raw sugar, brown sugar, and molasses. Mayonnaise, soy sauce, and tamari. Fresh basil, coriander, parsley, rosemary, and thyme.  Beverages  Water and mineral water. Sugar-sweetened soft drinks. Small amounts of orange juice or cranberry juice. Black and green tea. Most dry narda. Coffee.  The items listed above may not be a complete list of foods and beverages you can eat. Contact a dietitian for more information.     Foods to avoid  Fruits  Fresh, dried, and juiced forms of apple, pear, watermelon, peach, plum, cherries, apricots, blackberries, boysenberries, figs, nectarines, and pedrito. Avocado.  Vegetables  Chicory root, artichoke, asparagus, cabbage, snow peas, [place] sprouts, broccoli, sugar snap peas, mushrooms, celery, and cauliflower. Onions, garlic, leeks, and the white part of scallions.  Grains  Wheat, including kamut, durum, and semolina. Barley and bulgur. Couscous. Wheat-based cereals. Wheat noodles, bread, crackers, and pastries.  Meats and other proteins  Fried or fatty meat. Sausage. Cashews and pistachios. Soybeans, baked beans, black beans, chickpeas, kidney beans, genet beans, navy beans, lentils, black-eyed peas, and split peas.  Dairy  Milk, yogurt, ice cream, and soft cheese. Cream and sour cream. Milk-based sauces. Custard. Buttermilk. Soy milk.  Seasoning and other foods  Any sugar-free gum or candy. Foods that contain artificial sweeteners such as sorbitol, mannitol, isomalt, or xylitol. Foods that contain honey, high-fructose corn syrup, or agave. Bouillon, vegetable stock, beef stock, and chicken stock. Garlic and onion powder. Condiments made with onion, such as hummus, chutney, pickles, relish, salad dressing, and salsa. Tomato paste.  Beverages  Chicory-based drinks. Coffee substitutes. Chamomile tea. Fennel tea. " Sweet or fortified narda such as port or mirian. Diet soft drinks made with isomalt, mannitol, maltitol, sorbitol, or xylitol. Apple, pear, and pedrito juice. Juices with high-fructose corn syrup.  The items listed above may not be a complete list of foods and beverages you should avoid. Contact a dietitian for more information.     Summary  FODMAP stands for fermentable oligosaccharides, disaccharides, monosaccharides, and polyols. These are sugars that are hard for some people to digest.  A low-FODMAP eating plan is a short-term diet that helps to ease symptoms of certain bowel diseases.  The eating plan usually lasts up to 6 weeks. After that, high-FODMAP foods are reintroduced gradually and one at a time. This can help you find out which foods may be causing symptoms.  A low-FODMAP eating plan can be complicated. It is best to work with a dietitian who has experience with this type of plan.  This information is not intended to replace advice given to you by your health care provider. Make sure you discuss any questions you have with your health care provider.  Document Revised: 05/06/2021 Document Reviewed: 05/06/2021  Elsevier Patient Education © 2023 [employer]               RADHA Ward  11/19/2024    Please note that portions of this note were completed with a voice recognition program.     Patient or patient representative verbalized consent for the use of Ambient Listening during the visit with  RADHA Ward for chart documentation. 11/19/2024  16:26 EST

## 2024-11-19 NOTE — PATIENT INSTRUCTIONS
Antireflux measures: Avoid fried, fatty foods, alcohol, chocolate, coffee, tea,  soft drinks, all carbonated beverages (including sparkling water), peppermint and spearmint, spicy foods, tomatoes and tomato based foods, onions, peppers, and garlic.   Other antireflux measures include weight reduction if overweight, avoiding tight clothing around the abdomen, elevating the head of the bed 6 inches with blocks under the head board, and don't drink or eat before going to bed and avoid lying down immediately after meals.  Pantoprazole 40 mg 1 by mouth in the am 30 minutes before breakfast.  Zofran 4 mg 1 po every 8 hours as needed for nausea.  High fiber, low fat diet with liberal water intake.   Metamucil 1 packet/scoop daily or fiber gummies/capsules 2-4 per day.   Low FODMAP diet - avoid all dairy. May use lactose free/dairy free alternatives such as almond milk, rice milk, oat milk, etc.   Continue to avoid alcohol.  Miralax 17 grams daily as needed for constipation.   May add stool softeners 2 per day if needed for constipation.   Imodium 2 mg 1/2-1 tablet 1-2 times per day as needed for diarrhea >3 episodes.   CT Abdomen and pelvis  Follow up: 6 months        Low-FODMAP Eating Plan    FODMAP stands for fermentable oligosaccharides, disaccharides, monosaccharides, and polyols. These are sugars that are hard for some people to digest. A low-FODMAP eating plan may help some people who have irritable bowel syndrome (IBS) and certain other bowel (intestinal) diseases to manage their symptoms.  This meal plan can be complicated to follow. Work with a diet and nutrition specialist (dietitian) to make a low-FODMAP eating plan that is right for you. A dietitian can help make sure that you get enough nutrition from this diet.  What are tips for following this plan?  Reading food labels  Check labels for hidden FODMAPs such as:  High-fructose syrup.  Honey.  Agave.  Natural fruit flavors.  Onion or garlic powder.  Choose  low-FODMAP foods that contain 3-4 grams of fiber per serving.  Check food labels for serving sizes. Eat only one serving at a time to make sure FODMAP levels stay low.  Shopping  Shop with a list of foods that are recommended on this diet and make a meal plan.  Meal planning  Follow a low-FODMAP eating plan for up to 6 weeks, or as told by your health care provider or dietitian.  To follow the eating plan:  Eliminate high-FODMAP foods from your diet completely. Choose only low-FODMAP foods to eat. You will do this for 2-6 weeks.  Gradually reintroduce high-FODMAP foods into your diet one at a time. Most people should wait a few days before introducing the next new high-FODMAP food into their meal plan. Your dietitian can recommend how quickly you may reintroduce foods.  Keep a daily record of what and how much you eat and drink. Make note of any symptoms that you have after eating.  Review your daily record with a dietitian regularly to identify which foods you can eat and which foods you should avoid.  General tips  Drink enough fluid each day to keep your urine pale yellow.  Avoid processed foods. These often have added sugar and may be high in FODMAPs.  Avoid most dairy products, whole grains, and sweeteners.  Work with a dietitian to make sure you get enough fiber in your diet.  Avoid high FODMAP foods at meals to manage symptoms.     Recommended foods  Fruits  Bananas, oranges, tangerines, juana, limes, blueberries, raspberries, strawberries, grapes, cantaloupe, honeydew melon, kiwi, papaya, passion fruit, and pineapple. Limited amounts of dried cranberries, banana chips, and shredded coconut.  Vegetables  Eggplant, zucchini, cucumber, peppers, green beans, bean sprouts, lettuce, arugula, kale, Swiss chard, spinach, giuseppe greens, bok olga, summer squash, potato, and tomato. Limited amounts of corn, carrot, and sweet potato. Green parts of scallions.  Grains  Gluten-free grains, such as rice, oats, buckwheat,  "quinoa, corn, polenta, and millet. Gluten-free pasta, bread, or cereal. Rice noodles. Corn tortillas.  Meats and other proteins  Unseasoned beef, pork, poultry, or fish. Eggs. Chairez. Tofu (firm) and tempeh. Limited amounts of nuts and seeds, such as almonds, walnuts, brazil nuts, pecans, peanuts, nut butters, pumpkin seeds, day seeds, and sunflower seeds.  Dairy  Lactose-free milk, yogurt, and kefir. Lactose-free cottage cheese and ice cream. Non-dairy milks, such as almond, coconut, hemp, and rice milk. Non-dairy yogurt. Limited amounts of goat cheese, brie, mozzarella, parmesan, swiss, and other hard cheeses.  Fats and oils  Butter-free spreads. Vegetable oils, such as olive, canola, and sunflower oil.  Seasoning and other foods  Artificial sweeteners with names that do not end in \"ol,\" such as aspartame, saccharine, and stevia. Maple syrup, white table sugar, raw sugar, brown sugar, and molasses. Mayonnaise, soy sauce, and tamari. Fresh basil, coriander, parsley, rosemary, and thyme.  Beverages  Water and mineral water. Sugar-sweetened soft drinks. Small amounts of orange juice or cranberry juice. Black and green tea. Most dry narda. Coffee.  The items listed above may not be a complete list of foods and beverages you can eat. Contact a dietitian for more information.     Foods to avoid  Fruits  Fresh, dried, and juiced forms of apple, pear, watermelon, peach, plum, cherries, apricots, blackberries, boysenberries, figs, nectarines, and pedrito. Avocado.  Vegetables  Chicory root, artichoke, asparagus, cabbage, snow peas, [place] sprouts, broccoli, sugar snap peas, mushrooms, celery, and cauliflower. Onions, garlic, leeks, and the white part of scallions.  Grains  Wheat, including kamut, durum, and semolina. Barley and bulgur. Couscous. Wheat-based cereals. Wheat noodles, bread, crackers, and pastries.  Meats and other proteins  Fried or fatty meat. Sausage. Cashews and pistachios. Soybeans, baked beans, black " beans, chickpeas, kidney beans, genet beans, navy beans, lentils, black-eyed peas, and split peas.  Dairy  Milk, yogurt, ice cream, and soft cheese. Cream and sour cream. Milk-based sauces. Custard. Buttermilk. Soy milk.  Seasoning and other foods  Any sugar-free gum or candy. Foods that contain artificial sweeteners such as sorbitol, mannitol, isomalt, or xylitol. Foods that contain honey, high-fructose corn syrup, or agave. Bouillon, vegetable stock, beef stock, and chicken stock. Garlic and onion powder. Condiments made with onion, such as hummus, chutney, pickles, relish, salad dressing, and salsa. Tomato paste.  Beverages  Chicory-based drinks. Coffee substitutes. Chamomile tea. Fennel tea. Sweet or fortified narda such as port or mirian. Diet soft drinks made with isomalt, mannitol, maltitol, sorbitol, or xylitol. Apple, pear, and pedrito juice. Juices with high-fructose corn syrup.  The items listed above may not be a complete list of foods and beverages you should avoid. Contact a dietitian for more information.     Summary  FODMAP stands for fermentable oligosaccharides, disaccharides, monosaccharides, and polyols. These are sugars that are hard for some people to digest.  A low-FODMAP eating plan is a short-term diet that helps to ease symptoms of certain bowel diseases.  The eating plan usually lasts up to 6 weeks. After that, high-FODMAP foods are reintroduced gradually and one at a time. This can help you find out which foods may be causing symptoms.  A low-FODMAP eating plan can be complicated. It is best to work with a dietitian who has experience with this type of plan.  This information is not intended to replace advice given to you by your health care provider. Make sure you discuss any questions you have with your health care provider.  Document Revised: 05/06/2021 Document Reviewed: 05/06/2021  [employer] Patient Education © 2023 [employer]

## 2024-12-11 ENCOUNTER — HOSPITAL ENCOUNTER (OUTPATIENT)
Dept: CT IMAGING | Facility: HOSPITAL | Age: 34
Discharge: HOME | End: 2024-12-11
Admitting: NURSE PRACTITIONER
Payer: COMMERCIAL

## 2024-12-11 DIAGNOSIS — K62.5 RECTAL BLEEDING: Chronic | ICD-10-CM

## 2024-12-11 DIAGNOSIS — R10.12 LEFT UPPER QUADRANT ABDOMINAL PAIN: Chronic | ICD-10-CM

## 2024-12-11 DIAGNOSIS — R19.7 DIARRHEA, UNSPECIFIED TYPE: Chronic | ICD-10-CM

## 2024-12-11 DIAGNOSIS — K59.00 CONSTIPATION, UNSPECIFIED CONSTIPATION TYPE: Chronic | ICD-10-CM

## 2024-12-11 PROCEDURE — 25510000001 IOPAMIDOL 61 % SOLUTION: Performed by: NURSE PRACTITIONER

## 2024-12-11 PROCEDURE — 74177 CT ABD & PELVIS W/CONTRAST: CPT

## 2024-12-11 RX ORDER — IOPAMIDOL 612 MG/ML
100 INJECTION, SOLUTION INTRAVASCULAR
Status: COMPLETED | OUTPATIENT
Start: 2024-12-11 | End: 2024-12-11

## 2024-12-11 RX ADMIN — IOPAMIDOL 100 ML: 612 INJECTION, SOLUTION INTRAVENOUS at 15:03

## 2025-05-20 ENCOUNTER — OFFICE VISIT (OUTPATIENT)
Dept: GASTROENTEROLOGY | Facility: CLINIC | Age: 35
End: 2025-05-20
Payer: COMMERCIAL

## 2025-05-20 VITALS
BODY MASS INDEX: 28.31 KG/M2 | HEART RATE: 67 BPM | HEIGHT: 72 IN | DIASTOLIC BLOOD PRESSURE: 82 MMHG | SYSTOLIC BLOOD PRESSURE: 126 MMHG | OXYGEN SATURATION: 98 % | TEMPERATURE: 97.3 F | WEIGHT: 209 LBS

## 2025-05-20 DIAGNOSIS — K31.89 EROSIVE GASTROPATHY: Chronic | ICD-10-CM

## 2025-05-20 DIAGNOSIS — R74.8 ELEVATED LIVER ENZYMES: ICD-10-CM

## 2025-05-20 DIAGNOSIS — K92.9 FUNCTIONAL GASTROINTESTINAL DISORDER: Chronic | ICD-10-CM

## 2025-05-20 DIAGNOSIS — K62.5 RECTAL BLEEDING: Chronic | ICD-10-CM

## 2025-05-20 DIAGNOSIS — K21.00 GASTROESOPHAGEAL REFLUX DISEASE WITH ESOPHAGITIS WITHOUT HEMORRHAGE: Chronic | ICD-10-CM

## 2025-05-20 DIAGNOSIS — R19.7 DIARRHEA, UNSPECIFIED TYPE: Chronic | ICD-10-CM

## 2025-05-20 DIAGNOSIS — R16.2 HEPATOSPLENOMEGALY: Chronic | ICD-10-CM

## 2025-05-20 DIAGNOSIS — K58.2 IRRITABLE BOWEL SYNDROME WITH BOTH CONSTIPATION AND DIARRHEA: Primary | Chronic | ICD-10-CM

## 2025-05-20 DIAGNOSIS — R10.12 LEFT UPPER QUADRANT ABDOMINAL PAIN: Chronic | ICD-10-CM

## 2025-05-20 DIAGNOSIS — K59.00 CONSTIPATION, UNSPECIFIED CONSTIPATION TYPE: Chronic | ICD-10-CM

## 2025-05-20 DIAGNOSIS — F10.10 ETOH ABUSE: Chronic | ICD-10-CM

## 2025-05-20 PROCEDURE — 99214 OFFICE O/P EST MOD 30 MIN: CPT | Performed by: NURSE PRACTITIONER

## 2025-05-20 RX ORDER — PANTOPRAZOLE SODIUM 20 MG/1
20 TABLET, DELAYED RELEASE ORAL DAILY PRN
Qty: 90 TABLET | Refills: 3 | Status: SHIPPED | OUTPATIENT
Start: 2025-05-20

## 2025-05-20 NOTE — PROGRESS NOTES
Follow Up Note     Date: 2025   Patient Name: Femi Galvez  MRN: [id number]  : 1990     Primary Care Provider: Amalia Ortega APRN     Chief Complaint   Patient presents with    GI Problem     6 month check  Has made diet changes that have helped     2025  History of Present Illness  The patient is a 34-year-old male who is here for follow-up.    He reports an improvement in his condition, attributing it to dietary modifications. He has transitioned to a more pescatarian diet, which he believes has significantly reduced his acid reflux symptoms. He also notes a decrease in the frequency of constipation and diarrhea, which he associates with a reduction in alcohol consumption. He has not been taking pantoprazole due to forgetfulness and stress.  He denies any nausea or vomiting at this time.  He denies any GI bleeding.    He acknowledges a period of excessive alcohol intake in the past, which he suspects may have contributed to his elevated liver enzymes in the past.  Liver enzymes have been normal over the past year or 2.  He recalls undergoing labs not long ago at PCP office, the results of which were within normal limits according to the patient.  We do not have these results.     Interval History:  2024  The patient is a 34-year-old male here for a follow-up after scopes to discuss results.     He reports feeling better than his previous visit but continues to experience unusual pain in his left side. He describes a sensation of pressure and swelling in his abdomen that is worse after drinking alcohol. He admits to heavy alcohol consumption in the past. He has abstained from alcohol for the past 5 weeks, during which he noticed an improvement in his bowel movements, but still has some constipation and the left side pain.     He was prescribed pantoprazole, which he found beneficial, but misplaced it 3 weeks ago and is not taking it. Despite this, he did not notice any  change in his left side pain. He admits to inconsistent use of the medication. He may take it for a day or two, then forget it for a day or two before remembering to take it again.     7/22/2024  He has a history of constipation alternating with diarrhea for about 3 years. He has a history of alcohol abuse and when he is drinking alcohol, he has diarrhea and when he is sober and has not had alcohol he will have constipation. He doesn't take anything on a regular basis for either. He has had rectal bleeding off and on for a few years. He has a history of left upper quadrant pain that is constant for the past couple of years. If he drinks alcohol it is worse. He has had black stools in the past, but none in a year. He has been sober now for 5 days, but was drinking 12-24 beers per day for a few years. He had drank more than that in the past.  No nausea or vomiting unless he has too much to drink. He has some reflux after eating a large meal but he is not taking anything for reflux on a regular basis. He denies any hematemesis. Denies NSAID or marijuana/THC use.      He has not had colonoscopy or EGD in the past. His uncle possibly had liver cancer, but otherwise, no family history of GI malignancy.     Subjective      Past Medical History:   Diagnosis Date    Alcoholism 10/11/2019    On and off.    Asthma     Bipolar 1 disorder     Bipolar disorder     Depression     GERD (gastroesophageal reflux disease)     GI (gastrointestinal bleed)     Internal? Not since 2022    Heart disease     Heart murmur     Hypertension     Lowers when not drinking but seems little high    Irritable bowel syndrome     Unconfirmed     Past Surgical History:   Procedure Laterality Date    BACK SURGERY N/A 2022    COLONOSCOPY N/A 8/20/2024    Procedure: COLONOSCOPY WITH BIOPSY;  Surgeon: Riana Robison MD;  Location: [facility] ENDOSCOPY;  Service: Gastroenterology;  Laterality: N/A;    ENDOSCOPY N/A 8/20/2024    Procedure:  ESOPHAGOGASTRODUODENOSCOPY WITH BIOPSY;  Surgeon: Riana Robison MD;  Location: [facility] ENDOSCOPY;  Service: Gastroenterology;  Laterality: N/A;     Family History   Problem Relation Age of Onset    Hypertension Mother     Arthritis Father     Hypertension Father     Hyperlipidemia Father     Alcohol abuse Father     Hypertension Maternal Aunt     Cancer Maternal Aunt     Hypertension Maternal Uncle     Liver cancer Maternal Uncle         possible, but not sure    Hypertension Paternal Aunt     Diabetes Paternal Uncle     Hypertension Paternal Uncle     Hypertension Maternal Grandmother     Hypertension Maternal Grandfather     Arthritis Paternal Grandmother     Hypertension Paternal Grandmother     Arthritis Paternal Grandfather     Hypertension Paternal Grandfather     Liver disease Other     Mental illness Other     Obesity Other     Stroke Other     Colon cancer Neg Hx      Social History     Socioeconomic History    Marital status:    Tobacco Use    Smoking status: Former     Current packs/day: 0.00     Average packs/day: 0.5 packs/day for 7.8 years (3.9 ttl pk-yrs)     Types: Cigarettes     Start date:      Quit date: 10/5/2016     Years since quittin.6     Passive exposure: Yes    Smokeless tobacco: Never    Tobacco comments:     Off and on.   Vaping Use    Vaping status: Some Days   Substance and Sexual Activity    Alcohol use: Yes     Alcohol/week: 12.0 standard drinks of alcohol     Types: 12 Cans of beer per week     Comment: Differs. About the same or twice as much.    Drug use: No    Sexual activity: Yes     Partners: Female     Birth control/protection: Birth control pill       Current Outpatient Medications:     pantoprazole (PROTONIX) 20 MG EC tablet, Take 1 tablet by mouth Daily As Needed for Heartburn or Indigestion., Disp: 90 tablet, Rfl: 3    Allergies   Allergen Reactions    Marijuana (Cannabis Sativa) Swelling     The following portions of the patient's history were  "reviewed and updated as appropriate: allergies, current medications, past family history, past medical history, past social history, past surgical history and problem list.  Objective     Physical Exam  Vitals and nursing note reviewed.   Constitutional:       General: He is not in acute distress.     Appearance: Normal appearance. He is well-developed.   HENT:      Head: Normocephalic and atraumatic.      Mouth/Throat:      Mouth: Mucous membranes are not pale, not dry and not cyanotic.   Eyes:      General: Lids are normal.   Neck:      Trachea: Trachea normal.   Cardiovascular:      Rate and Rhythm: Normal rate.   Pulmonary:      Effort: Pulmonary effort is normal. No respiratory distress.      Breath sounds: Normal breath sounds.   Abdominal:      Tenderness: There is no abdominal tenderness.   Skin:     General: Skin is warm and dry.   Neurological:      Mental Status: He is alert and oriented to person, place, and time.   Psychiatric:         Mood and Affect: Mood normal.         Speech: Speech normal.         Behavior: Behavior normal. Behavior is cooperative.       Vitals:    05/20/25 1550   BP: 126/82   Pulse: 67   Temp: 97.3 °F (36.3 °C)   SpO2: 98%   Weight: 94.8 kg (209 lb)   Height: 182.9 cm (72.01\")     Body mass index is 28.34 kg/m².     Results Review:   I reviewed the patient's new clinical results.    No visits with results within 90 Day(s) from this visit.   Latest known visit with results is:   Admission on 08/20/2024, Discharged on 08/20/2024   Component Date Value Ref Range Status    Reference Lab Report 08/20/2024    Final                    Value:[facility]  [address] [place] [address]  Phone: [phone] or [phone]  Fax: [phone]  Cholo Pablo M.D., Medical Director    PATIENT NAME                                     LABORATORY NO.  GAURAV WELDON.                           [id number]  [id number]                                 " AGE                    SEX   SSN              [facility]  [facility]                    34        1990           [id number]      [id number]    [address]                        REQUESTING M.D.           ATTENDING M.D.         COPY TO.  [address]                                [place] [address]                         HELADIO GLEASON  DATE COLLECTED            DATE RECEIVED          DATE REPORTED  2024    DIAGNOSIS:  A.     DUODENUM, BIOPSY:  Duodenal type mucosa with no significant    histopathologic abnormalities  B.     ANTRUM AND BODY, BIOPSY:  Gastric mucosa with mild chronic inactive gastritis  Negative for intestinal metaplasia or dysplasia  No Helicobacter pylori-like organisms seen  C.     TERMINAL ILEUM BIOPSY:  Small intestinal mucosa with no significant histopathologic abnormalities  D.     RANDOM COLON BIOPSY:  Colonic type mucosa with no significant histopathologic abnormalities    OLLIE     REVIEWED, DIAGNOSED AND ELECTRONICALLY  SIGNED BY:    Jeffrey Garcia M.D., [email or web address].  CPT CODES:  88305x4       Comprehensive Metabolic Panel (2024 11:16)  CBC (No Diff) (2024 11:16)  TSH (2024 11:16)  IgA (2024 11:16)  Tissue Transglutaminase, IgA (2024 11:16)  H. Pylori Breath Test - Breath, Lung (2024 11:16)  Calprotectin, Fecal - Stool, Per Rectum (2024 16:00)  Pancreatic Elastase, Fecal - Stool, Per Rectum (2024 16:00)     US Abdomen complete     2024  Normal abdominal ultrasound.      EGD dated 2024 per Dr. Robison  - Normal oropharynx.  - Z-line irregular, 39 cm from the incisors.  - LA Grade A reflux esophagitis with no bleeding.  - Mild Erythematous mucosa in the posterior wall of the stomach and antrum.  - Mild Erosive gastropathy with no stigmata of recent bleeding. Biopsied.  - Normal duodenal bulb, first  portion of the duodenum, second portion of the duodenum and third portion of the duodenum. Biopsied.  A.     DUODENUM, BIOPSY:  Duodenal type mucosa with no significant histopathologic abnormalities  B.     ANTRUM AND BODY, BIOPSY:  Gastric mucosa with mild chronic inactive gastritis  Negative for intestinal metaplasia or dysplasia  No Helicobacter pylori-like organisms seen      Colonoscopy dated 8/20/2024 per Dr. Robison  - Preparation of the colon was fair.  - Decreased sphincter tone found on digital rectal exam.  - The rectum, sigmoid colon, descending colon, splenic flexure, transverse colon, hepatic flexure, ascending colon, cecum, recto-sigmoid colon, ileocecal valve and appendiceal orifice are normal. Biopsied.  - The examined portion of the ileum was normal. Biopsied for change in bowel habit.  - Non-bleeding external and internal hemorrhoids.  - No endoscopic evidence of colitis or ileitis  C.     TERMINAL ILEUM BIOPSY:   Small intestinal mucosa with no significant histopathologic abnormalities   D.     RANDOM COLON BIOPSY:   Colonic type mucosa with no significant histopathologic abnormalities      CTAP with contrast     12/11/2024  No acute intra-abdominal abnormality.  Mild hepatosplenomegaly. Recommend correlation with liver function  tests.    ADDENDUM:   Labs 12/21/2024 from PCP Office:  Total bilirubin 0.8 alkaline phosphatase 67 AST 20 ALT 38 hemoglobin 14.7 hematocrit 44.5 platelet count 245    Assessment / Plan      1. Irritable bowel syndrome with both constipation and diarrhea  2. Functional gastrointestinal disorder  3. Diarrhea, unspecified type  4. Constipation, unspecified constipation type  5. Rectal bleeding  6. Left upper quadrant abdominal pain  7. Gastroesophageal reflux disease with esophagitis without hemorrhage  8. Erosive gastropathy  He has made diet changes and cut back on alcohol and his symptoms have significantly improved.  He is not having any significant GI symptoms at  this time.  He is not taking PPI daily due to forgetfulness, he does not feel he needs it daily.  He denies any GI bleeding. Basic labs unremarkable. TSH normal. Celiac panel negative. H. pylori breath test negative. Fecal calprotectin normal. Pancreatic elastase normal. Abdominal ultrasound dated 4/1/2024 unremarkable. EGD dated 8/20/2024 with LA grade a reflux esophagitis and mild erosive gastropathy with no stigmata of recent bleeding. Biopsies unremarkable, no H. pylori. Colonoscopy dated 8/20/2024 with internal/external hemorrhoids, no endoscopic evidence of colitis or ileitis. Terminal ileum and random biopsies unremarkable.  CTAP dated 12/11/2024 with GI tract unremarkable.  Likely functional gastrointestinal disorder/IBS at baseline, constipation predominant, symptoms worsened with alcohol use.     Continue to cut back on alcohol use.  Smoking cessation.  Antireflux measures  Pantoprazole 20 mg daily as needed.  Zofran as needed for nausea.  Metamucil or fiber Gummies daily.  Low FODMAP diet  May use MiraLAX, stool softeners or Imodium as needed.    - pantoprazole (PROTONIX) 20 MG EC tablet; Take 1 tablet by mouth Daily As Needed for Heartburn or Indigestion.  Dispense: 90 tablet; Refill: 3    9. Hepatosplenomegaly  10. ETOH abuse  11. Elevated liver enzymes  BMI 28.34  CTAP dated 12/11/2024 with mild hepatosplenomegaly noted.  Labs in July 2024 with liver enzymes normal.  Upon review of records, he had mild elevation ofAST and ALT in 2020.  Patient states he was drinking alcohol heavily at that time.  He states he had labs not long ago at PCP office that were normal, we do not have these results.  Advise low-fat diet, exercise and maintaining ideal body weight.  Continue to cut back on alcohol use.  Will call to obtain recent labs from PCP office.  Noninvasive liver evaluation-patient declines at this time.  He will consider in the future.    Patient Instructions   Antireflux measures: Avoid fried, fatty  foods, alcohol, chocolate, coffee, tea,  soft drinks, all carbonated beverages (including sparkling water), peppermint and spearmint, spicy foods, tomatoes and tomato based foods, onions, peppers, and garlic.   Other antireflux measures include weight reduction if overweight, avoiding tight clothing around the abdomen, elevating the head of the bed 6 inches with blocks under the head board, and don't drink or eat before going to bed and avoid lying down immediately after meals.  Pantoprazole 20 mg 1 by mouth once a day as needed for reflux or indigestion.   Zofran 4 mg 1 po every 8 hours as needed for nausea.  High fiber, low fat diet with liberal water intake.   Metamucil 1 packet/scoop daily or fiber gummies/capsules 2-4 per day.   Low FODMAP diet - avoid all dairy. May use lactose free/dairy free alternatives such as almond milk, rice milk, oat milk, etc.   Continue to cut back on alcohol.  Miralax 17 grams daily as needed for constipation.   May add stool softeners 2 per day if needed for constipation.   Imodium 2 mg 1/2-1 tablet 1-2 times per day as needed for diarrhea >3 episodes.   Advised to exercise 30 minutes 4-5 days per week.   Advised to lose 10-15 pounds in the next 6-12 months.  Will call PCP office to get copy of most recent labs.  Follow up: 1 year or sooner if needed        Low-FODMAP Eating Plan    FODMAP stands for fermentable oligosaccharides, disaccharides, monosaccharides, and polyols. These are sugars that are hard for some people to digest. A low-FODMAP eating plan may help some people who have irritable bowel syndrome (IBS) and certain other bowel (intestinal) diseases to manage their symptoms.  This meal plan can be complicated to follow. Work with a diet and nutrition specialist (dietitian) to make a low-FODMAP eating plan that is right for you. A dietitian can help make sure that you get enough nutrition from this diet.  What are tips for following this plan?  Reading food labels  Check  labels for hidden FODMAPs such as:  High-fructose syrup.  Honey.  Agave.  Natural fruit flavors.  Onion or garlic powder.  Choose low-FODMAP foods that contain 3-4 grams of fiber per serving.  Check food labels for serving sizes. Eat only one serving at a time to make sure FODMAP levels stay low.  Shopping  Shop with a list of foods that are recommended on this diet and make a meal plan.  Meal planning  Follow a low-FODMAP eating plan for up to 6 weeks, or as told by your health care provider or dietitian.  To follow the eating plan:  Eliminate high-FODMAP foods from your diet completely. Choose only low-FODMAP foods to eat. You will do this for 2-6 weeks.  Gradually reintroduce high-FODMAP foods into your diet one at a time. Most people should wait a few days before introducing the next new high-FODMAP food into their meal plan. Your dietitian can recommend how quickly you may reintroduce foods.  Keep a daily record of what and how much you eat and drink. Make note of any symptoms that you have after eating.  Review your daily record with a dietitian regularly to identify which foods you can eat and which foods you should avoid.  General tips  Drink enough fluid each day to keep your urine pale yellow.  Avoid processed foods. These often have added sugar and may be high in FODMAPs.  Avoid most dairy products, whole grains, and sweeteners.  Work with a dietitian to make sure you get enough fiber in your diet.  Avoid high FODMAP foods at meals to manage symptoms.     Recommended foods  Fruits  Bananas, oranges, tangerines, juana, limes, blueberries, raspberries, strawberries, grapes, cantaloupe, honeydew melon, kiwi, papaya, passion fruit, and pineapple. Limited amounts of dried cranberries, banana chips, and shredded coconut.  Vegetables  Eggplant, zucchini, cucumber, peppers, green beans, bean sprouts, lettuce, arugula, kale, Swiss chard, spinach, giuseppe greens, bok olga, summer squash, potato, and tomato.  "Limited amounts of corn, carrot, and sweet potato. Green parts of scallions.  Grains  Gluten-free grains, such as rice, oats, buckwheat, quinoa, corn, polenta, and millet. Gluten-free pasta, bread, or cereal. Rice noodles. Corn tortillas.  Meats and other proteins  Unseasoned beef, pork, poultry, or fish. Eggs. Chairez. Tofu (firm) and tempeh. Limited amounts of nuts and seeds, such as almonds, walnuts, brazil nuts, pecans, peanuts, nut butters, pumpkin seeds, day seeds, and sunflower seeds.  Dairy  Lactose-free milk, yogurt, and kefir. Lactose-free cottage cheese and ice cream. Non-dairy milks, such as almond, coconut, hemp, and rice milk. Non-dairy yogurt. Limited amounts of goat cheese, brie, mozzarella, parmesan, swiss, and other hard cheeses.  Fats and oils  Butter-free spreads. Vegetable oils, such as olive, canola, and sunflower oil.  Seasoning and other foods  Artificial sweeteners with names that do not end in \"ol,\" such as aspartame, saccharine, and stevia. Maple syrup, white table sugar, raw sugar, brown sugar, and molasses. Mayonnaise, soy sauce, and tamari. Fresh basil, coriander, parsley, rosemary, and thyme.  Beverages  Water and mineral water. Sugar-sweetened soft drinks. Small amounts of orange juice or cranberry juice. Black and green tea. Most dry narda. Coffee.  The items listed above may not be a complete list of foods and beverages you can eat. Contact a dietitian for more information.     Foods to avoid  Fruits  Fresh, dried, and juiced forms of apple, pear, watermelon, peach, plum, cherries, apricots, blackberries, boysenberries, figs, nectarines, and pedrito. Avocado.  Vegetables  Chicory root, artichoke, asparagus, cabbage, snow peas, [place] sprouts, broccoli, sugar snap peas, mushrooms, celery, and cauliflower. Onions, garlic, leeks, and the white part of scallions.  Grains  Wheat, including kamut, durum, and semolina. Barley and bulgur. Couscous. Wheat-based cereals. Wheat noodles, bread, " crackers, and pastries.  Meats and other proteins  Fried or fatty meat. Sausage. Cashews and pistachios. Soybeans, baked beans, black beans, chickpeas, kidney beans, genet beans, navy beans, lentils, black-eyed peas, and split peas.  Dairy  Milk, yogurt, ice cream, and soft cheese. Cream and sour cream. Milk-based sauces. Custard. Buttermilk. Soy milk.  Seasoning and other foods  Any sugar-free gum or candy. Foods that contain artificial sweeteners such as sorbitol, mannitol, isomalt, or xylitol. Foods that contain honey, high-fructose corn syrup, or agave. Bouillon, vegetable stock, beef stock, and chicken stock. Garlic and onion powder. Condiments made with onion, such as hummus, chutney, pickles, relish, salad dressing, and salsa. Tomato paste.  Beverages  Chicory-based drinks. Coffee substitutes. Chamomile tea. Fennel tea. Sweet or fortified narda such as port or mirian. Diet soft drinks made with isomalt, mannitol, maltitol, sorbitol, or xylitol. Apple, pear, and pedrito juice. Juices with high-fructose corn syrup.  The items listed above may not be a complete list of foods and beverages you should avoid. Contact a dietitian for more information.     Summary  FODMAP stands for fermentable oligosaccharides, disaccharides, monosaccharides, and polyols. These are sugars that are hard for some people to digest.  A low-FODMAP eating plan is a short-term diet that helps to ease symptoms of certain bowel diseases.  The eating plan usually lasts up to 6 weeks. After that, high-FODMAP foods are reintroduced gradually and one at a time. This can help you find out which foods may be causing symptoms.  A low-FODMAP eating plan can be complicated. It is best to work with a dietitian who has experience with this type of plan.  This information is not intended to replace advice given to you by your health care provider. Make sure you discuss any questions you have with your health care provider.  Document Revised: 05/06/2021  Document Reviewed: 05/06/2021  [employer] Patient Education © 2023 [employer]               RADHA Ward  5/22/2025    Please note that portions of this note were completed with a voice recognition program.     Patient or patient representative verbalized consent for the use of Ambient Listening during the visit with  RADHA Ward for chart documentation. 5/22/2025  16:07 EDT

## 2025-05-20 NOTE — PATIENT INSTRUCTIONS
Antireflux measures: Avoid fried, fatty foods, alcohol, chocolate, coffee, tea,  soft drinks, all carbonated beverages (including sparkling water), peppermint and spearmint, spicy foods, tomatoes and tomato based foods, onions, peppers, and garlic.   Other antireflux measures include weight reduction if overweight, avoiding tight clothing around the abdomen, elevating the head of the bed 6 inches with blocks under the head board, and don't drink or eat before going to bed and avoid lying down immediately after meals.  Pantoprazole 20 mg 1 by mouth once a day as needed for reflux or indigestion.   Zofran 4 mg 1 po every 8 hours as needed for nausea.  High fiber, low fat diet with liberal water intake.   Metamucil 1 packet/scoop daily or fiber gummies/capsules 2-4 per day.   Low FODMAP diet - avoid all dairy. May use lactose free/dairy free alternatives such as almond milk, rice milk, oat milk, etc.   Continue to cut back on alcohol.  Miralax 17 grams daily as needed for constipation.   May add stool softeners 2 per day if needed for constipation.   Imodium 2 mg 1/2-1 tablet 1-2 times per day as needed for diarrhea >3 episodes.   Advised to exercise 30 minutes 4-5 days per week.   Advised to lose 10-15 pounds in the next 6-12 months.  Will call PCP office to get copy of most recent labs.  Follow up: 1 year or sooner if needed        Low-FODMAP Eating Plan    FODMAP stands for fermentable oligosaccharides, disaccharides, monosaccharides, and polyols. These are sugars that are hard for some people to digest. A low-FODMAP eating plan may help some people who have irritable bowel syndrome (IBS) and certain other bowel (intestinal) diseases to manage their symptoms.  This meal plan can be complicated to follow. Work with a diet and nutrition specialist (dietitian) to make a low-FODMAP eating plan that is right for you. A dietitian can help make sure that you get enough nutrition from this diet.  What are tips for following  this plan?  Reading food labels  Check labels for hidden FODMAPs such as:  High-fructose syrup.  Honey.  Agave.  Natural fruit flavors.  Onion or garlic powder.  Choose low-FODMAP foods that contain 3-4 grams of fiber per serving.  Check food labels for serving sizes. Eat only one serving at a time to make sure FODMAP levels stay low.  Shopping  Shop with a list of foods that are recommended on this diet and make a meal plan.  Meal planning  Follow a low-FODMAP eating plan for up to 6 weeks, or as told by your health care provider or dietitian.  To follow the eating plan:  Eliminate high-FODMAP foods from your diet completely. Choose only low-FODMAP foods to eat. You will do this for 2-6 weeks.  Gradually reintroduce high-FODMAP foods into your diet one at a time. Most people should wait a few days before introducing the next new high-FODMAP food into their meal plan. Your dietitian can recommend how quickly you may reintroduce foods.  Keep a daily record of what and how much you eat and drink. Make note of any symptoms that you have after eating.  Review your daily record with a dietitian regularly to identify which foods you can eat and which foods you should avoid.  General tips  Drink enough fluid each day to keep your urine pale yellow.  Avoid processed foods. These often have added sugar and may be high in FODMAPs.  Avoid most dairy products, whole grains, and sweeteners.  Work with a dietitian to make sure you get enough fiber in your diet.  Avoid high FODMAP foods at meals to manage symptoms.     Recommended foods  Fruits  Bananas, oranges, tangerines, juana, limes, blueberries, raspberries, strawberries, grapes, cantaloupe, honeydew melon, kiwi, papaya, passion fruit, and pineapple. Limited amounts of dried cranberries, banana chips, and shredded coconut.  Vegetables  Eggplant, zucchini, cucumber, peppers, green beans, bean sprouts, lettuce, arugula, kale, Swiss chard, spinach, giuseppe greens, bok olga,  "summer squash, potato, and tomato. Limited amounts of corn, carrot, and sweet potato. Green parts of scallions.  Grains  Gluten-free grains, such as rice, oats, buckwheat, quinoa, corn, polenta, and millet. Gluten-free pasta, bread, or cereal. Rice noodles. Corn tortillas.  Meats and other proteins  Unseasoned beef, pork, poultry, or fish. Eggs. Chairez. Tofu (firm) and tempeh. Limited amounts of nuts and seeds, such as almonds, walnuts, brazil nuts, pecans, peanuts, nut butters, pumpkin seeds, day seeds, and sunflower seeds.  Dairy  Lactose-free milk, yogurt, and kefir. Lactose-free cottage cheese and ice cream. Non-dairy milks, such as almond, coconut, hemp, and rice milk. Non-dairy yogurt. Limited amounts of goat cheese, brie, mozzarella, parmesan, swiss, and other hard cheeses.  Fats and oils  Butter-free spreads. Vegetable oils, such as olive, canola, and sunflower oil.  Seasoning and other foods  Artificial sweeteners with names that do not end in \"ol,\" such as aspartame, saccharine, and stevia. Maple syrup, white table sugar, raw sugar, brown sugar, and molasses. Mayonnaise, soy sauce, and tamari. Fresh basil, coriander, parsley, rosemary, and thyme.  Beverages  Water and mineral water. Sugar-sweetened soft drinks. Small amounts of orange juice or cranberry juice. Black and green tea. Most dry narda. Coffee.  The items listed above may not be a complete list of foods and beverages you can eat. Contact a dietitian for more information.     Foods to avoid  Fruits  Fresh, dried, and juiced forms of apple, pear, watermelon, peach, plum, cherries, apricots, blackberries, boysenberries, figs, nectarines, and pedrito. Avocado.  Vegetables  Chicory root, artichoke, asparagus, cabbage, snow peas, [place] sprouts, broccoli, sugar snap peas, mushrooms, celery, and cauliflower. Onions, garlic, leeks, and the white part of scallions.  Grains  Wheat, including kamut, durum, and semolina. Barley and bulgur. Couscous. " Wheat-based cereals. Wheat noodles, bread, crackers, and pastries.  Meats and other proteins  Fried or fatty meat. Sausage. Cashews and pistachios. Soybeans, baked beans, black beans, chickpeas, kidney beans, genet beans, navy beans, lentils, black-eyed peas, and split peas.  Dairy  Milk, yogurt, ice cream, and soft cheese. Cream and sour cream. Milk-based sauces. Custard. Buttermilk. Soy milk.  Seasoning and other foods  Any sugar-free gum or candy. Foods that contain artificial sweeteners such as sorbitol, mannitol, isomalt, or xylitol. Foods that contain honey, high-fructose corn syrup, or agave. Bouillon, vegetable stock, beef stock, and chicken stock. Garlic and onion powder. Condiments made with onion, such as hummus, chutney, pickles, relish, salad dressing, and salsa. Tomato paste.  Beverages  Chicory-based drinks. Coffee substitutes. Chamomile tea. Fennel tea. Sweet or fortified narda such as port or mirian. Diet soft drinks made with isomalt, mannitol, maltitol, sorbitol, or xylitol. Apple, pear, and pedrito juice. Juices with high-fructose corn syrup.  The items listed above may not be a complete list of foods and beverages you should avoid. Contact a dietitian for more information.     Summary  FODMAP stands for fermentable oligosaccharides, disaccharides, monosaccharides, and polyols. These are sugars that are hard for some people to digest.  A low-FODMAP eating plan is a short-term diet that helps to ease symptoms of certain bowel diseases.  The eating plan usually lasts up to 6 weeks. After that, high-FODMAP foods are reintroduced gradually and one at a time. This can help you find out which foods may be causing symptoms.  A low-FODMAP eating plan can be complicated. It is best to work with a dietitian who has experience with this type of plan.  This information is not intended to replace advice given to you by your health care provider. Make sure you discuss any questions you have with your health care  provider.  Document Revised: 05/06/2021 Document Reviewed: 05/06/2021  [employer] Patient Education © 2023 [employer]

## (undated) DEVICE — Device

## (undated) DEVICE — VLV SXN AIR/H2O ORCAPOD3 1P/U STRL

## (undated) DEVICE — ENDOSCOPY PORT CONNECTOR FOR OLYMPUS® SCOPES: Brand: ERBE

## (undated) DEVICE — LUBE JELLY PK/2.75GM STRL BX/144

## (undated) DEVICE — HYBRID CO2 TUBING/CAP SET FOR OLYMPUS® SCOPES & CO2 SOURCE: Brand: ERBE

## (undated) DEVICE — FRCP BX RADJAW4 NDL 2.8 240 STD OG

## (undated) DEVICE — CONMED SCOPE SAVER BITE BLOCK, 20X27 MM: Brand: SCOPE SAVER